# Patient Record
Sex: FEMALE | Race: WHITE | Employment: UNEMPLOYED | URBAN - METROPOLITAN AREA
[De-identification: names, ages, dates, MRNs, and addresses within clinical notes are randomized per-mention and may not be internally consistent; named-entity substitution may affect disease eponyms.]

---

## 2017-12-04 ENCOUNTER — APPOINTMENT (OUTPATIENT)
Dept: CT IMAGING | Age: 47
End: 2017-12-04
Attending: EMERGENCY MEDICINE
Payer: COMMERCIAL

## 2017-12-04 ENCOUNTER — HOSPITAL ENCOUNTER (OUTPATIENT)
Age: 47
Setting detail: OBSERVATION
Discharge: HOME | End: 2017-12-05
Attending: EMERGENCY MEDICINE | Admitting: INTERNAL MEDICINE
Payer: COMMERCIAL

## 2017-12-04 ENCOUNTER — APPOINTMENT (OUTPATIENT)
Dept: MRI IMAGING | Age: 47
End: 2017-12-04
Attending: PSYCHIATRY & NEUROLOGY
Payer: COMMERCIAL

## 2017-12-04 DIAGNOSIS — G45.1 TRANSIENT ISCHEMIC ATTACK INVOLVING RIGHT INTERNAL CAROTID ARTERY: ICD-10-CM

## 2017-12-04 DIAGNOSIS — G40.209 PARTIAL SYMPTOMATIC EPILEPSY WITH COMPLEX PARTIAL SEIZURES, NOT INTRACTABLE, WITHOUT STATUS EPILEPTICUS (HCC): ICD-10-CM

## 2017-12-04 DIAGNOSIS — I65.23 BILATERAL CAROTID ARTERY STENOSIS: ICD-10-CM

## 2017-12-04 DIAGNOSIS — G45.9 TRANSIENT CEREBRAL ISCHEMIA, UNSPECIFIED TYPE: Primary | ICD-10-CM

## 2017-12-04 DIAGNOSIS — R41.82 ALTERED MENTAL STATUS, UNSPECIFIED ALTERED MENTAL STATUS TYPE: ICD-10-CM

## 2017-12-04 DIAGNOSIS — R20.0 LEFT SIDED NUMBNESS: ICD-10-CM

## 2017-12-04 DIAGNOSIS — M96.1 CERVICAL POST-LAMINECTOMY SYNDROME: ICD-10-CM

## 2017-12-04 PROBLEM — R20.2 PARESTHESIA: Status: ACTIVE | Noted: 2017-12-04

## 2017-12-04 PROBLEM — I10 HYPERTENSION: Status: ACTIVE | Noted: 2017-12-04

## 2017-12-04 PROBLEM — E78.5 HYPERLIPIDEMIA: Status: ACTIVE | Noted: 2017-12-04

## 2017-12-04 LAB
ALBUMIN SERPL-MCNC: 4.3 G/DL (ref 3.5–5)
ALBUMIN/GLOB SERPL: 1.1 {RATIO} (ref 1.1–2.2)
ALP SERPL-CCNC: 56 U/L (ref 45–117)
ALT SERPL-CCNC: 38 U/L (ref 12–78)
AMPHET UR QL SCN: NEGATIVE
ANION GAP SERPL CALC-SCNC: 6 MMOL/L (ref 5–15)
APPEARANCE UR: ABNORMAL
AST SERPL-CCNC: 27 U/L (ref 15–37)
BACTERIA URNS QL MICRO: ABNORMAL /HPF
BARBITURATES UR QL SCN: NEGATIVE
BASOPHILS # BLD: 0 K/UL (ref 0–0.1)
BASOPHILS NFR BLD: 0 % (ref 0–1)
BENZODIAZ UR QL: NEGATIVE
BILIRUB SERPL-MCNC: 0.4 MG/DL (ref 0.2–1)
BILIRUB UR QL: NEGATIVE
BUN SERPL-MCNC: 13 MG/DL (ref 6–20)
BUN/CREAT SERPL: 13 (ref 12–20)
CALCIUM SERPL-MCNC: 8.8 MG/DL (ref 8.5–10.1)
CANNABINOIDS UR QL SCN: NEGATIVE
CHLORIDE SERPL-SCNC: 102 MMOL/L (ref 97–108)
CHOLEST SERPL-MCNC: 268 MG/DL
CO2 SERPL-SCNC: 29 MMOL/L (ref 21–32)
COCAINE UR QL SCN: NEGATIVE
COLOR UR: ABNORMAL
CREAT SERPL-MCNC: 1.02 MG/DL (ref 0.55–1.02)
CRP SERPL HS-MCNC: 1 MG/L
DRUG SCRN COMMENT,DRGCM: ABNORMAL
EOSINOPHIL # BLD: 0.4 K/UL (ref 0–0.4)
EOSINOPHIL NFR BLD: 3 % (ref 0–7)
EPITH CASTS URNS QL MICRO: ABNORMAL /LPF
ERYTHROCYTE [DISTWIDTH] IN BLOOD BY AUTOMATED COUNT: 12.5 % (ref 11.5–14.5)
ERYTHROCYTE [SEDIMENTATION RATE] IN BLOOD: 3 MM/HR (ref 0–20)
EST. AVERAGE GLUCOSE BLD GHB EST-MCNC: 114 MG/DL
GLOBULIN SER CALC-MCNC: 4 G/DL (ref 2–4)
GLUCOSE SERPL-MCNC: 113 MG/DL (ref 65–100)
GLUCOSE UR STRIP.AUTO-MCNC: NEGATIVE MG/DL
HBA1C MFR BLD: 5.6 % (ref 4.2–6.3)
HCG UR QL: NEGATIVE
HCT VFR BLD AUTO: 41.2 % (ref 35–47)
HDLC SERPL-MCNC: 48 MG/DL
HDLC SERPL: 5.6 {RATIO} (ref 0–5)
HGB BLD-MCNC: 13.8 G/DL (ref 11.5–16)
HGB UR QL STRIP: NEGATIVE
KETONES UR QL STRIP.AUTO: NEGATIVE MG/DL
LDLC SERPL CALC-MCNC: 169.4 MG/DL (ref 0–100)
LEUKOCYTE ESTERASE UR QL STRIP.AUTO: NEGATIVE
LIPID PROFILE,FLP: ABNORMAL
LYMPHOCYTES # BLD: 2 K/UL (ref 0.8–3.5)
LYMPHOCYTES NFR BLD: 19 % (ref 12–49)
MCH RBC QN AUTO: 30.9 PG (ref 26–34)
MCHC RBC AUTO-ENTMCNC: 33.5 G/DL (ref 30–36.5)
MCV RBC AUTO: 92.2 FL (ref 80–99)
METHADONE UR QL: NEGATIVE
MONOCYTES # BLD: 0.5 K/UL (ref 0–1)
MONOCYTES NFR BLD: 4 % (ref 5–13)
MUCOUS THREADS URNS QL MICRO: ABNORMAL /LPF
NEUTS SEG # BLD: 7.7 K/UL (ref 1.8–8)
NEUTS SEG NFR BLD: 74 % (ref 32–75)
NITRITE UR QL STRIP.AUTO: NEGATIVE
OPIATES UR QL: POSITIVE
PCP UR QL: NEGATIVE
PH UR STRIP: 7 [PH] (ref 5–8)
PLATELET # BLD AUTO: 283 K/UL (ref 150–400)
POTASSIUM SERPL-SCNC: 3.6 MMOL/L (ref 3.5–5.1)
PROT SERPL-MCNC: 8.3 G/DL (ref 6.4–8.2)
PROT UR STRIP-MCNC: NEGATIVE MG/DL
RBC # BLD AUTO: 4.47 M/UL (ref 3.8–5.2)
RBC #/AREA URNS HPF: ABNORMAL /HPF (ref 0–5)
SODIUM SERPL-SCNC: 137 MMOL/L (ref 136–145)
SP GR UR REFRACTOMETRY: 1.02 (ref 1–1.03)
T3FREE SERPL-MCNC: 2.8 PG/ML (ref 2.2–4)
T4 FREE SERPL-MCNC: 1.1 NG/DL (ref 0.8–1.5)
TRIGL SERPL-MCNC: 253 MG/DL (ref ?–150)
TSH SERPL DL<=0.05 MIU/L-ACNC: 0.64 UIU/ML (ref 0.36–3.74)
UROBILINOGEN UR QL STRIP.AUTO: 1 EU/DL (ref 0.2–1)
VLDLC SERPL CALC-MCNC: 50.6 MG/DL
WBC # BLD AUTO: 10.5 K/UL (ref 3.6–11)
WBC URNS QL MICRO: ABNORMAL /HPF (ref 0–4)

## 2017-12-04 PROCEDURE — 96374 THER/PROPH/DIAG INJ IV PUSH: CPT

## 2017-12-04 PROCEDURE — 84481 FREE ASSAY (FT-3): CPT | Performed by: INTERNAL MEDICINE

## 2017-12-04 PROCEDURE — 85652 RBC SED RATE AUTOMATED: CPT | Performed by: INTERNAL MEDICINE

## 2017-12-04 PROCEDURE — 80307 DRUG TEST PRSMV CHEM ANLYZR: CPT | Performed by: INTERNAL MEDICINE

## 2017-12-04 PROCEDURE — 70553 MRI BRAIN STEM W/O & W/DYE: CPT

## 2017-12-04 PROCEDURE — 80053 COMPREHEN METABOLIC PANEL: CPT | Performed by: EMERGENCY MEDICINE

## 2017-12-04 PROCEDURE — 99218 HC RM OBSERVATION: CPT

## 2017-12-04 PROCEDURE — 77030032490 HC SLV COMPR SCD KNE COVD -B

## 2017-12-04 PROCEDURE — 74011250636 HC RX REV CODE- 250/636: Performed by: INTERNAL MEDICINE

## 2017-12-04 PROCEDURE — 74011250637 HC RX REV CODE- 250/637: Performed by: HOSPITALIST

## 2017-12-04 PROCEDURE — 84443 ASSAY THYROID STIM HORMONE: CPT | Performed by: INTERNAL MEDICINE

## 2017-12-04 PROCEDURE — 74011250637 HC RX REV CODE- 250/637: Performed by: INTERNAL MEDICINE

## 2017-12-04 PROCEDURE — 95816 EEG AWAKE AND DROWSY: CPT | Performed by: INTERNAL MEDICINE

## 2017-12-04 PROCEDURE — 83036 HEMOGLOBIN GLYCOSYLATED A1C: CPT | Performed by: INTERNAL MEDICINE

## 2017-12-04 PROCEDURE — 36415 COLL VENOUS BLD VENIPUNCTURE: CPT | Performed by: EMERGENCY MEDICINE

## 2017-12-04 PROCEDURE — 96361 HYDRATE IV INFUSION ADD-ON: CPT

## 2017-12-04 PROCEDURE — 84439 ASSAY OF FREE THYROXINE: CPT | Performed by: INTERNAL MEDICINE

## 2017-12-04 PROCEDURE — 99284 EMERGENCY DEPT VISIT MOD MDM: CPT

## 2017-12-04 PROCEDURE — 74011250636 HC RX REV CODE- 250/636: Performed by: PSYCHIATRY & NEUROLOGY

## 2017-12-04 PROCEDURE — 86592 SYPHILIS TEST NON-TREP QUAL: CPT | Performed by: INTERNAL MEDICINE

## 2017-12-04 PROCEDURE — 81025 URINE PREGNANCY TEST: CPT | Performed by: EMERGENCY MEDICINE

## 2017-12-04 PROCEDURE — 80061 LIPID PANEL: CPT | Performed by: INTERNAL MEDICINE

## 2017-12-04 PROCEDURE — 85025 COMPLETE CBC W/AUTO DIFF WBC: CPT | Performed by: EMERGENCY MEDICINE

## 2017-12-04 PROCEDURE — 70544 MR ANGIOGRAPHY HEAD W/O DYE: CPT

## 2017-12-04 PROCEDURE — 86038 ANTINUCLEAR ANTIBODIES: CPT | Performed by: INTERNAL MEDICINE

## 2017-12-04 PROCEDURE — A9576 INJ PROHANCE MULTIPACK: HCPCS | Performed by: INTERNAL MEDICINE

## 2017-12-04 PROCEDURE — 70450 CT HEAD/BRAIN W/O DYE: CPT

## 2017-12-04 PROCEDURE — 81001 URINALYSIS AUTO W/SCOPE: CPT | Performed by: EMERGENCY MEDICINE

## 2017-12-04 PROCEDURE — 86141 C-REACTIVE PROTEIN HS: CPT | Performed by: INTERNAL MEDICINE

## 2017-12-04 RX ORDER — HYDROCHLOROTHIAZIDE 12.5 MG/1
12.5 CAPSULE ORAL DAILY
Status: DISCONTINUED | OUTPATIENT
Start: 2017-12-05 | End: 2017-12-05

## 2017-12-04 RX ORDER — LORAZEPAM 2 MG/ML
0.5 INJECTION INTRAMUSCULAR
Status: DISCONTINUED | OUTPATIENT
Start: 2017-12-04 | End: 2017-12-05

## 2017-12-04 RX ORDER — SODIUM CHLORIDE 0.9 % (FLUSH) 0.9 %
5-10 SYRINGE (ML) INJECTION AS NEEDED
Status: DISCONTINUED | OUTPATIENT
Start: 2017-12-04 | End: 2017-12-05 | Stop reason: HOSPADM

## 2017-12-04 RX ORDER — OXYCODONE AND ACETAMINOPHEN 5; 325 MG/1; MG/1
1 TABLET ORAL
Status: DISCONTINUED | OUTPATIENT
Start: 2017-12-04 | End: 2017-12-05 | Stop reason: HOSPADM

## 2017-12-04 RX ORDER — ENOXAPARIN SODIUM 100 MG/ML
40 INJECTION SUBCUTANEOUS DAILY
Status: DISCONTINUED | OUTPATIENT
Start: 2017-12-05 | End: 2017-12-05 | Stop reason: HOSPADM

## 2017-12-04 RX ORDER — ENOXAPARIN SODIUM 100 MG/ML
40 INJECTION SUBCUTANEOUS EVERY 24 HOURS
Status: DISCONTINUED | OUTPATIENT
Start: 2017-12-04 | End: 2017-12-04

## 2017-12-04 RX ORDER — ATORVASTATIN CALCIUM 20 MG/1
20 TABLET, FILM COATED ORAL DAILY
COMMUNITY
End: 2017-12-13

## 2017-12-04 RX ORDER — NITROFURANTOIN 25; 75 MG/1; MG/1
100 CAPSULE ORAL 2 TIMES DAILY
COMMUNITY
Start: 2017-12-01 | End: 2017-12-07

## 2017-12-04 RX ORDER — LANOLIN ALCOHOL/MO/W.PET/CERES
3 CREAM (GRAM) TOPICAL
Status: DISCONTINUED | OUTPATIENT
Start: 2017-12-04 | End: 2017-12-05 | Stop reason: HOSPADM

## 2017-12-04 RX ORDER — LISINOPRIL 20 MG/1
20 TABLET ORAL DAILY
Status: DISCONTINUED | OUTPATIENT
Start: 2017-12-05 | End: 2017-12-05

## 2017-12-04 RX ORDER — ATORVASTATIN CALCIUM 20 MG/1
20 TABLET, FILM COATED ORAL
Status: DISCONTINUED | OUTPATIENT
Start: 2017-12-04 | End: 2017-12-05 | Stop reason: HOSPADM

## 2017-12-04 RX ORDER — LISINOPRIL AND HYDROCHLOROTHIAZIDE 12.5; 2 MG/1; MG/1
1 TABLET ORAL DAILY
COMMUNITY
End: 2017-12-05

## 2017-12-04 RX ORDER — NITROFURANTOIN 25; 75 MG/1; MG/1
100 CAPSULE ORAL 2 TIMES DAILY
Status: DISCONTINUED | OUTPATIENT
Start: 2017-12-04 | End: 2017-12-05 | Stop reason: HOSPADM

## 2017-12-04 RX ORDER — ENOXAPARIN SODIUM 100 MG/ML
40 INJECTION SUBCUTANEOUS DAILY
Status: DISCONTINUED | OUTPATIENT
Start: 2017-12-04 | End: 2017-12-04

## 2017-12-04 RX ORDER — SODIUM CHLORIDE 9 MG/ML
75 INJECTION, SOLUTION INTRAVENOUS CONTINUOUS
Status: DISCONTINUED | OUTPATIENT
Start: 2017-12-04 | End: 2017-12-05

## 2017-12-04 RX ORDER — GUAIFENESIN 100 MG/5ML
81 LIQUID (ML) ORAL DAILY
Status: DISCONTINUED | OUTPATIENT
Start: 2017-12-04 | End: 2017-12-05 | Stop reason: HOSPADM

## 2017-12-04 RX ORDER — SODIUM CHLORIDE 0.9 % (FLUSH) 0.9 %
5-10 SYRINGE (ML) INJECTION EVERY 8 HOURS
Status: DISCONTINUED | OUTPATIENT
Start: 2017-12-04 | End: 2017-12-05 | Stop reason: HOSPADM

## 2017-12-04 RX ORDER — LABETALOL HYDROCHLORIDE 5 MG/ML
10 INJECTION, SOLUTION INTRAVENOUS
Status: DISCONTINUED | OUTPATIENT
Start: 2017-12-04 | End: 2017-12-05 | Stop reason: HOSPADM

## 2017-12-04 RX ORDER — ACETAMINOPHEN 325 MG/1
325 TABLET ORAL
Status: DISCONTINUED | OUTPATIENT
Start: 2017-12-04 | End: 2017-12-05 | Stop reason: HOSPADM

## 2017-12-04 RX ADMIN — SODIUM CHLORIDE 75 ML/HR: 900 INJECTION, SOLUTION INTRAVENOUS at 14:25

## 2017-12-04 RX ADMIN — OXYCODONE HYDROCHLORIDE AND ACETAMINOPHEN 1 TABLET: 5; 325 TABLET ORAL at 23:57

## 2017-12-04 RX ADMIN — MELATONIN 3 MG ORAL TABLET 3 MG: 3 TABLET ORAL at 22:06

## 2017-12-04 RX ADMIN — ASPIRIN 81 MG 81 MG: 81 TABLET ORAL at 14:30

## 2017-12-04 RX ADMIN — LORAZEPAM 0.5 MG: 2 INJECTION INTRAMUSCULAR; INTRAVENOUS at 17:29

## 2017-12-04 RX ADMIN — Medication 10 ML: at 14:30

## 2017-12-04 RX ADMIN — NITROFURANTOIN (MONOHYDRATE/MACROCRYSTALS) 100 MG: 75; 25 CAPSULE ORAL at 17:29

## 2017-12-04 RX ADMIN — GADOTERIDOL 15 ML: 279.3 INJECTION, SOLUTION INTRAVENOUS at 19:25

## 2017-12-04 RX ADMIN — ACETAMINOPHEN 325 MG: 325 TABLET ORAL at 16:52

## 2017-12-04 RX ADMIN — Medication 10 ML: at 22:07

## 2017-12-04 NOTE — ROUTINE PROCESS

## 2017-12-04 NOTE — PROGRESS NOTES
Pharmacy Clarification of Prior to Admission Medication Regimen     The patient was interviewed regarding clarification of the prior to admission medication regimen. Friend present in room and obtained permission from patient to discuss drug regimen with visitor(s) present. Patient was questioned regarding use of any other inhalers, topical products, over the counter medications, herbal medications, vitamin products or ophthalmic/nasal/otic medication use. Information Obtained From: RX query, medication vials and patient    Pertinent Pharmacy Findings:   Atorvastatin 20 mg: Patient was prescribed and started taking this agent ~3 weeks ago, but after four days of therapy developed abdominal pain and stopped taking medication as she believed it was an adverse effect of the medication. She has not discussed this with her provider, thus the medication has been left on the [facility] medication list as \"not taking\"   Sleep Aids (melatonin PO, emergen-C packets and ibuprofen + diphenhydramine): patient states that she \"rotates\" her use of these agents to help her sleep. Of note, she could not remember the strength of her OTC melatonin and the Emergen-C formulation she uses apparently also includes melatonin. [facility] medication list was corrected to the following:     Prior to Admission Medications   Prescriptions Last Dose Informant Patient Reported? Taking? Ascorbic Acid-Multivits-Min (EMERGEN-C) 1,000 mg pwep 11/27/2017 at Unknown time Self Yes Yes   Sig: Take 1 Packet by mouth nightly as needed. Ibuprofen-diphenhydrAMINE (ADVIL PM) 200-38 mg tab 12/3/2017 at Unknown time Self Yes Yes   Sig: Take 2 Tabs by mouth nightly. MELATONIN PO 12/3/2017 at Unknown time Self Yes Yes   Sig: Take 3 Tabs by mouth nightly as needed (sleep aid). atorvastatin (LIPITOR) 20 mg tablet 11/27/2017 Self Yes No   Sig: Take 20 mg by mouth daily.    lisinopril-hydroCHLOROthiazide (PRINZIDE, ZESTORETIC) 20-12.5 mg per tablet 12/4/2017 at Unknown time Self Yes Yes   Sig: Take 1 Tab by mouth daily. nitrofurantoin, macrocrystal-monohydrate, (MACROBID) 100 mg capsule 12/4/2017 at Unknown time Self Yes Yes   Sig: Take 100 mg by mouth two (2) times a day. ondansetron (ZOFRAN ODT) 4 mg disintegrating tablet 11/27/2017 at Unknown time Self No Yes   Sig: Take 1 Tab by mouth every eight (8) hours as needed for Nausea.       Facility-Administered Medications: None          Thank you,  Sowmya Peraza, PharmD, BCPS  Clinical Pharmacy Specialist

## 2017-12-04 NOTE — CONSULTS
Consult  REFERRED BY:  Carla Shannon MD    CHIEF COMPLAINT: Numbness of the left side      Subjective:     Kayleen Gee is a 41 y.o. right-handed  female seen as a new patient to us for evaluation of a new problem of numbness of the left side and intermittent muscle spasms and jerks of the left side and have been present since 12/2/2017 at the request of Dr. Gail Teixeira. She also feels a heavy sensation in her chest.  Her symptoms seem to get worse when she lays down improve when she stands up. She has had a recent urinary tract infection. She had some adverse twitching of her muscles on Paxil which she took about a month ago. She has never had this problem before. Her head CT scan of the brain was normal.  She scheduled for an MRI scan. She scheduled for an MRA and carotid Dopplers. She is also scheduled for an EEG. She does not seem to have any twitches now, but still feels that her left side is not quite as strong as the right. Her only risk factors for stroke include high blood pressure. She denies any unusual stress and tension. Her routine metabolic studies are relatively unremarkable. She did have a previous cervical laminectomy, but her neck has not caused any increased pain recently.     Past Medical History:   Diagnosis Date    Hypertension       Past Surgical History:   Procedure Laterality Date    HX CHOLECYSTECTOMY      HX ORTHOPAEDIC      herniated disc     HX UROLOGICAL      URETHRAL REPAIR     Family History   Problem Relation Age of Onset    Hypertension Mother     Stroke Mother     Hypertension Father     Dementia Father       Social History   Substance Use Topics    Smoking status: Never Smoker    Smokeless tobacco: Never Used    Alcohol use 1.5 oz/week     3 Glasses of wine per week         Current Facility-Administered Medications:     sodium chloride (NS) flush 5-10 mL, 5-10 mL, IntraVENous, Q8H, Evelina Jenkins MD    sodium chloride (NS) flush 5-10 mL, 5-10 mL, IntraVENous, PRN, Robin Dailey MD    atorvastatin (LIPITOR) tablet 20 mg, 20 mg, Oral, QHS, Zack Macedo MD    nitrofurantoin (macrocrystal-monohydrate) (MACROBID) capsule 100 mg, 100 mg, Oral, BID, Zack Macedo MD, 100 mg at 12/04/17 1729    0.9% sodium chloride infusion, 75 mL/hr, IntraVENous, CONTINUOUS, Zack Macedo MD, Last Rate: 75 mL/hr at 12/04/17 1425, 75 mL/hr at 12/04/17 1425    aspirin chewable tablet 81 mg, 81 mg, Oral, DAILY, Zack Macedo MD, 81 mg at 12/04/17 1430    labetalol (NORMODYNE;TRANDATE) injection 10 mg, 10 mg, IntraVENous, Q6H PRN, Zack Macedo MD    oxyCODONE-acetaminophen (PERCOCET) 5-325 mg per tablet 1 Tab, 1 Tab, Oral, Q6H PRN, Zack Macedo MD    sodium chloride (NS) flush 5-10 mL, 5-10 mL, IntraVENous, Q8H, Zack Macedo MD, 10 mL at 12/04/17 1430    sodium chloride (NS) flush 5-10 mL, 5-10 mL, IntraVENous, PRN, Zack Macedo MD    [START ON 12/5/2017] enoxaparin (LOVENOX) injection 40 mg, 40 mg, SubCUTAneous, DAILY, Zack Macedo MD    prochlorperazine (COMPAZINE) with saline injection 10 mg, 10 mg, IntraVENous, Q6H PRN, Zack Macedo MD    [START ON 12/5/2017] lisinopril (PRINIVIL, ZESTRIL) tablet 20 mg, 20 mg, Oral, DAILY, MD Carlito Vasquez ON 12/5/2017] hydroCHLOROthiazide (MICROZIDE) capsule 12.5 mg, 12.5 mg, Oral, DAILY, Zack Macedo MD    acetaminophen (TYLENOL) tablet 325 mg, 325 mg, Oral, Q4H PRN, Ananya Duncan MD, 325 mg at 12/04/17 1652    LORazepam (ATIVAN) injection 0.5 mg, 0.5 mg, IntraVENous, Q6H PRN, Renato Eddy MD, 0.5 mg at 12/04/17 2290        Allergies   Allergen Reactions    Norvasc [Amlodipine] Swelling     Swelling to feet and ankles    Paxil [Paroxetine Hcl] Other (comments)     Serotonin SD        Review of Systems:  A comprehensive review of systems was negative except for: Constitutional: positive for fatigue and malaise  Musculoskeletal: positive for myalgias, arthralgias, stiff joints and back pain  Neurological: positive for seizures, paresthesia, coordination problems, gait problems and weakness  Behvioral/Psych: positive for anxiety and depression   Vitals:    12/04/17 1233 12/04/17 1402 12/04/17 1411 12/04/17 1604   BP:  113/72  109/74   Pulse:  77  80   Resp:  18  18   Temp:  98 °F (36.7 °C)  98.2 °F (36.8 °C)   SpO2:  97%  95%   Weight: 175 lb (79.4 kg)      Height: 5' 7\" (1.702 m)  5' 7\" (1.702 m)      Objective:     I      NEUROLOGICAL EXAM:    Appearance: The patient is well developed, well nourished, provides a coherent history and is in no acute distress. Mental Status: Oriented to time, place and person, and the president, cognitive function is normal and speech is fluent and no aphasia or dysarthria. Mood and affect appropriate, but seems a bit depressed and anxious. Cranial Nerves:   Intact visual fields. Fundi are benign. DARIO, EOM's full, no nystagmus, no ptosis. Facial sensation is normal. Corneal reflexes are not tested. Facial movement is symmetric. Hearing is normal bilaterally. Palate is midline with normal sternocleidomastoid and trapezius muscles are normal. Tongue is midline. Neck without meningismus or bruits  Temporal arteries are not tender or enlarged   Motor:  5/5 strength in upper and lower proximal and distal muscles. Normal bulk and tone. No fasciculations. Reflexes:   Deep tendon reflexes 2+/4 and symmetrical.  No babinski or clonus present   Sensory:   Normal to touch, pinprick and vibration and temperature. DSS is intact   Gait:  Normal gait. Tremor:   No tremor noted. Cerebellar:  No cerebellar signs present. Neurovascular:  Normal heart sounds and regular rhythm, peripheral pulses intact, and no carotid bruits. Assessment:       ICD-10-CM ICD-9-CM    1.  Transient cerebral ischemia, unspecified type G45.9 435.9      Active Problems:    Hypertension (12/4/2017)      Hyperlipidemia (12/4/2017)      Paresthesia (12/4/2017)      Transient ischemic attack involving right internal carotid artery (12/4/2017)      Bilateral carotid artery stenosis (12/4/2017)      Nonintractable epilepsy with complex partial seizures ([address] (12/4/2017)      Altered mental status, unspecified (12/4/2017)      Left sided numbness (12/4/2017)      Cervical post-laminectomy syndrome (12/4/2017)        Plan:     Patient with unusual symptoms of left-sided numbness and left-sided muscle spasms and jerks, could be a focal seizure but it does not sound like that, will need MRI scan of the brain to rule out tumor, rule out stroke, without causes of her symptoms. Also get an EEG and carotid Doppler study to rule out other causes of her symptoms. His echocardiogram and cardiac monitoring  Continue antiplatelets and statin for now, further treatment evaluation pending the results of her clinical course and testing results and response to therapy. Her exam objectively is not too remarkable though she still complains of subjective weakness and numbness on the left side.     Signed By: Chikis Ghosh MD     December 4, 2017       CC: Carla Shannon MD  FAX: None

## 2017-12-04 NOTE — PROGRESS NOTES
[facility] Stroke Education [place] and Stroke Education provided to patient and the following topics were discussed    1. Patients personal risk factors for stroke are family history hpyerlipids    2. Warning signs of Stroke:        * Sudden numbness or weakness of the face, arm or leg, especially on one side of          The body            * Sudden confusion, trouble speaking or understanding        * Sudden trouble seeing in one or both eyes        * Sudden trouble walking, dizziness, loss of balance or coordination        * Sudden severe headache with no known cause      3. Importance of activation Emergency Medical Services ( 9-1-1 ) immediately if                       experience any warning signs of stroke. 4. Be sure and schedule a follow-up appointment with your primary care doctor or any                  specialists as instructed. 5. You must take medicine every day to treat your risk factors for stroke. Be sure to take your medicines exactly as your doctor tells you: no more, no less. Know what your medicines are for , what they do. Anti-thrombotics /anticoagulants can help prevent strokes. You are taking the following medicine(s)  asa     6. Smoking and second-hand smoke greatly increase your risk of stroke, cardiovascular disease and death.  Smoking history never

## 2017-12-04 NOTE — ED PROVIDER NOTES
EMERGENCY DEPARTMENT HISTORY AND PHYSICAL EXAM      Date: 12/4/2017  Patient Name: Joselyn Gonzalez    History of Presenting Illness     Chief Complaint   Patient presents with    Numbness     Pt. complains of \"uncontrollable twitching on left side\" that started since Saturday. Pt. was seen on Friday with UTI        History Provided By: Patient    HPI: Joselyn Gonzalez, 41 y.o. female with PMHx significant for HTN, presents ambulatory to the ED with cc of intermittent left sided facial numbness, LUE weakness and tremors to the left upper and lower extremities since 12/2/2017. Pt also reports that she feels a heavy sensation to her chest. She states that her sx's worsen when she lays flat and improves when she stands up. She reports taking Aleve and Advil PM but denies any relief these medications. She reports that she was seen by her OBGYN on 12/1/2017. She states she was diagnosed with a UTI and prescribed Macrobid BID. She reports experiencing tremors in the past, noting her PCP believed that this was related to an adverse reaction to Paxil at that time. As a result she notes concern about whether her sx's today could be related to her taking Macrobid. However she states she's taken this medication in the past with no complications. She specifically denies any fevers, chills, nausea, vomiting, shortness of breath, headache, rash, diarrhea, sweating or weight loss. PCP: Carla Shannon MD    There are no other complaints, changes, or physical findings at this time.     Current Facility-Administered Medications   Medication Dose Route Frequency Provider Last Rate Last Dose    sodium chloride (NS) flush 5-10 mL  5-10 mL IntraVENous Q8H Perla Lyon MD        sodium chloride (NS) flush 5-10 mL  5-10 mL IntraVENous PRN Perla Lyon MD         Current Outpatient Prescriptions   Medication Sig Dispense Refill    phenazopyridine (PYRIDIUM) 100 mg tablet Take 200 mg by mouth three (3) times daily (after meals).  hydrocodone-acetaminophen (VICODIN) 5-500 mg per tablet Take 1 Tab by mouth.  silodosin (RAPAFLO) 8 mg capsule Take 8 mg by mouth daily (with breakfast).  oxycodone-acetaminophen (PERCOCET) 5-325 mg per tablet Take 1-2 Tabs by mouth every four (4) hours as needed for Pain. 20 Tab 0    ondansetron (ZOFRAN ODT) 4 mg disintegrating tablet Take 1 Tab by mouth every eight (8) hours as needed for Nausea. 10 Tab 0    lisinopril (PRINIVIL, ZESTRIL) 10 mg tablet Take  by mouth daily.  hydrochlorothiazide (HYDRODIURIL) 25 mg tablet Take 25 mg by mouth daily. Past History     Past Medical History:  Past Medical History:   Diagnosis Date    Hypertension        Past Surgical History:  Past Surgical History:   Procedure Laterality Date    HX CHOLECYSTECTOMY      HX ORTHOPAEDIC      herniated disc     HX UROLOGICAL      URETHRAL REPAIR       Family History:  Family History   Problem Relation Age of Onset   George Dent Hypertension Mother     Hypertension Father        Social History:  Social History   Substance Use Topics    Smoking status: Never Smoker    Smokeless tobacco: Never Used    Alcohol use 1.5 oz/week     3 Glasses of wine per week       Allergies: Allergies   Allergen Reactions    Norvasc [Amlodipine] Swelling     Swelling to feet and ankles         Review of Systems   Review of Systems   Constitutional: Negative for activity change, appetite change, chills, fatigue, fever and unexpected weight change. HENT: Negative. Negative for congestion, hearing loss, rhinorrhea, sneezing and voice change. Eyes: Negative. Negative for pain and visual disturbance. Respiratory: Negative. Negative for apnea, cough, choking, chest tightness and shortness of breath. Cardiovascular: Negative for palpitations. +heavy sensation to chest   Gastrointestinal: Negative. Negative for abdominal distention, abdominal pain, blood in stool, diarrhea, nausea and vomiting.    Genitourinary: Negative. Negative for difficulty urinating, flank pain, frequency and urgency. No discharge   Musculoskeletal: Negative for arthralgias, back pain, myalgias and neck stiffness. Skin: Negative. Negative for color change and rash. Neurological: Positive for tremors, weakness and numbness. Negative for dizziness, seizures, syncope, speech difficulty and headaches. Hematological: Negative for adenopathy. Psychiatric/Behavioral: Negative. Negative for agitation, behavioral problems, dysphoric mood and suicidal ideas. The patient is not nervous/anxious. All other systems reviewed and are negative. Physical Exam   Physical Exam   Constitutional: She is oriented to person, place, and time. She appears well-developed and well-nourished. No distress. HENT:   Head: Normocephalic and atraumatic. Mouth/Throat: Oropharynx is clear and moist. No oropharyngeal exudate. Eyes: Conjunctivae and EOM are normal. Pupils are equal, round, and reactive to light. Right eye exhibits no discharge. Left eye exhibits no discharge. Neck: Normal range of motion. Neck supple. Cardiovascular: Normal rate, regular rhythm and intact distal pulses. Exam reveals no gallop and no friction rub. No murmur heard. Pulmonary/Chest: Effort normal and breath sounds normal. No respiratory distress. She has no wheezes. She has no rales. She exhibits no tenderness. Abdominal: Soft. Bowel sounds are normal. She exhibits no distension and no mass. There is no tenderness. There is no rebound and no guarding. Musculoskeletal: Normal range of motion. She exhibits no edema. Lymphadenopathy:     She has no cervical adenopathy. Neurological: She is alert and oriented to person, place, and time. No cranial nerve deficit. Coordination normal.   No appreciable motor, neuro, vascular or sensory deficits    Skin: Skin is warm and dry. No rash noted. No erythema. Psychiatric: Her mood appears anxious.    Nursing note and vitals reviewed. Diagnostic Study Results     Labs -     Recent Results (from the past 12 hour(s))   CBC WITH AUTOMATED DIFF    Collection Time: 12/04/17 10:07 AM   Result Value Ref Range    WBC 10.5 3.6 - 11.0 K/uL    RBC 4.47 3.80 - 5.20 M/uL    HGB 13.8 11.5 - 16.0 g/dL    HCT 41.2 35.0 - 47.0 %    MCV 92.2 80.0 - 99.0 FL    MCH 30.9 26.0 - 34.0 PG    MCHC 33.5 30.0 - 36.5 g/dL    RDW 12.5 11.5 - 14.5 %    PLATELET 325 640 - 509 K/uL    NEUTROPHILS 74 32 - 75 %    LYMPHOCYTES 19 12 - 49 %    MONOCYTES 4 (L) 5 - 13 %    EOSINOPHILS 3 0 - 7 %    BASOPHILS 0 0 - 1 %    ABS. NEUTROPHILS 7.7 1.8 - 8.0 K/UL    ABS. LYMPHOCYTES 2.0 0.8 - 3.5 K/UL    ABS. MONOCYTES 0.5 0.0 - 1.0 K/UL    ABS. EOSINOPHILS 0.4 0.0 - 0.4 K/UL    ABS. BASOPHILS 0.0 0.0 - 0.1 K/UL   METABOLIC PANEL, COMPREHENSIVE    Collection Time: 12/04/17 10:07 AM   Result Value Ref Range    Sodium 137 136 - 145 mmol/L    Potassium 3.6 3.5 - 5.1 mmol/L    Chloride 102 97 - 108 mmol/L    CO2 29 21 - 32 mmol/L    Anion gap 6 5 - 15 mmol/L    Glucose 113 (H) 65 - 100 mg/dL    BUN 13 6 - 20 MG/DL    Creatinine 1.02 0.55 - 1.02 MG/DL    BUN/Creatinine ratio 13 12 - 20      GFR est AA >60 >60 ml/min/1.73m2    GFR est non-AA 59 (L) >60 ml/min/1.73m2    Calcium 8.8 8.5 - 10.1 MG/DL    Bilirubin, total 0.4 0.2 - 1.0 MG/DL    ALT (SGPT) 38 12 - 78 U/L    AST (SGOT) 27 15 - 37 U/L    Alk.  phosphatase 56 45 - 117 U/L    Protein, total 8.3 (H) 6.4 - 8.2 g/dL    Albumin 4.3 3.5 - 5.0 g/dL    Globulin 4.0 2.0 - 4.0 g/dL    A-G Ratio 1.1 1.1 - 2.2     URINALYSIS W/MICROSCOPIC    Collection Time: 12/04/17 10:07 AM   Result Value Ref Range    Color YELLOW/STRAW      Appearance CLOUDY (A) CLEAR      Specific gravity 1.016 1.003 - 1.030      pH (UA) 7.0 5.0 - 8.0      Protein NEGATIVE  NEG mg/dL    Glucose NEGATIVE  NEG mg/dL    Ketone NEGATIVE  NEG mg/dL    Bilirubin NEGATIVE  NEG      Blood NEGATIVE  NEG      Urobilinogen 1.0 0.2 - 1.0 EU/dL    Nitrites NEGATIVE  NEG Leukocyte Esterase NEGATIVE  NEG      WBC 0-4 0 - 4 /hpf    RBC 0-5 0 - 5 /hpf    Epithelial cells MODERATE (A) FEW /lpf    Bacteria 1+ (A) NEG /hpf    Mucus TRACE (A) NEG /lpf       Radiologic Studies -   CT Results  (Last 48 hours)               12/04/17 1019  CT HEAD WO CONT Final result    Impression:  IMPRESSION: Normal head CT. Narrative:  EXAM:  CT HEAD WO CONT   INDICATION:  Patient presents with 2 day history of \"uncontrollable twitching of   left side\". Patient also complains of three-day history of numbness on the left   side of her face. TECHNIQUE:    Thin axial images were obtained through the calvarium and saved with standard   and bone window algorithm. Coronal and sagittal reconstructions were generated. CT dose reduction was achieved through use of a standardized protocol tailored   for this examination and automatic exposure control for dose modulation. COMPARISON: CT head 12/9/08   FINDINGS:   The ventricular size and configuration are normal.    The cerebral density is normal throughout. No evidence of intracranial hemorrhage, infarct, mass or abnormal extra-axial   fluid collections. Visualized osseous structures of the calvarium and skull base including   paranasal sinuses are unremarkable. Medical Decision Making   I am the first provider for this patient. I reviewed the vital signs, available nursing notes, past medical history, past surgical history, family history and social history. Vital Signs-Reviewed the patient's vital signs.   Patient Vitals for the past 12 hrs:   Temp Pulse Resp BP SpO2   12/04/17 1030 - 82 22 124/77 97 %   12/04/17 0955 98.5 °F (36.9 °C) 84 18 130/86 97 %       Pulse Oximetry Analysis - 98% on room air    Cardiac Monitor:   Rate: 84 bpm  Rhythm: Normal Sinus     Records Reviewed: Nursing Notes and Old Medical Records    Provider Notes (Medical Decision Making):   DDx: CVA, TIA, UTI, electrolyte abnormality, anxiety    ED Course:   Initial assessment performed. The patients presenting problems have been discussed, and they are in agreement with the care plan formulated and outlined with them. I have encouraged them to ask questions as they arise throughout their visit. PROGRESS NOTE:  10:01 AM  Pt is outside the window for TPA. Written by Arpit Mitchell, MIKE Scribe, as dictated by Rhodia Habermann. Cathy Conde MD    CONSULT NOTE:   11:07 AM  Rhodia Habermann. aCthy Conde MD spoke with Dr. Raven Hill,   Specialty: Hospitalist  Discussed pt's hx, disposition, and available diagnostic and imaging results. Reviewed care plans. Consultant will evaluate pt for admission. Written by MIKE Padillaibe, as dictated by Rhodia Habermann. Cathy Conde MD.    Disposition:  11:07 AM  Patient is being admitted to the hospital.  The results of their tests and reasons for their admission have been discussed with them and/or available family. They convey agreement and understanding for the need to be admitted and for their admission diagnosis. Consultation has been made with the inpatient physician specialist for hospitalization. PLAN:  1. Admit to Hospital    Diagnosis     Clinical Impression:   1. Transient cerebral ischemia, unspecified type        Attestations: This is note is prepared by Arpit Mitchell, acting as Scribe for Rhodia Habermann. Cathy Conde [place] [id number] Cathy Conde MD The scribe's documentation has been prepared under my direction and personally reviewed by me in its entirety. I confirm that the note above accurately reflects all work, treatment, procedures, and medical decision making performed by me.

## 2017-12-04 NOTE — IP AVS SNAPSHOT
[address] [facility] 83. 
[phone] Patient: Lorraine Mendez MRN: [id number] QTF:9/6/7982 My Medications STOP taking these medications ADVIL -38 mg Tab Generic drug:  Ibuprofen-diphenhydrAMINE  
   
  
 lisinopril-hydroCHLOROthiazide 20-12.5 mg per tablet Commonly known as:  PRINZIDE, ZESTORETIC  
   
  
  
TAKE these medications as instructed Instructions Each Dose to Equal  
 Morning Noon Evening Bedtime  
 atorvastatin 20 mg tablet Commonly known as:  LIPITOR Your last dose was: Your next dose is: Take 20 mg by mouth daily. 20 mg  
    
   
   
   
  
 butalbital-acetaminophen-caffeine -40 mg per tablet Commonly known as:  Baljinder Jesus Your last dose was: Your next dose is: Take 1 Tab by mouth every six (6) hours as needed for Headache. Max Daily Amount: 4 Tabs. 1 Tab  
    
   
   
   
  
 clonazePAM 0.5 mg tablet Commonly known as:  Elta Halsted Your last dose was: Your next dose is: Take 1 Tab by mouth two (2) times a day. Max Daily Amount: 1 mg. 0.5 mg  
    
   
   
   
  
 EMERGEN-C 1,000 mg Pwep Generic drug:  Ascorbic Acid-Multivits-Min Your last dose was: Your next dose is: Take 1 Packet by mouth nightly as needed. 1 Packet MACROBID 100 mg capsule Generic drug:  nitrofurantoin (macrocrystal-monohydrate) Your last dose was: Your next dose is: Take 100 mg by mouth two (2) times a day. 100 mg  
    
   
   
   
  
 MELATONIN PO Your last dose was: Your next dose is: Take 3 Tabs by mouth nightly as needed (sleep aid). 3 Tab  
    
   
   
   
  
 ondansetron 4 mg disintegrating tablet Commonly known as:  ZOFRAN ODT Your last dose was: Your next dose is: Take 1 Tab by mouth every eight (8) hours as needed for Nausea. 4 mg Where to Get Your Medications Information on where to get these meds will be given to you by the nurse or doctor. ! Ask your nurse or doctor about these medications  
  butalbital-acetaminophen-caffeine -40 mg per tablet  
 clonazePAM 0.5 mg tablet

## 2017-12-04 NOTE — PROGRESS NOTES
Primary Nurse Kavita Robins RN and Toribio Lake RN performed a dual skin assessment on this patient No impairment noted  Nigel score is 23

## 2017-12-04 NOTE — ED NOTES
TRANSFER - OUT REPORT:    Verbal report given to Lydia on Kathryn Vargas  being transferred to [facility] for routine progression of care       Report consisted of patients Situation, Background, Assessment and   Recommendations(SBAR). Information from the following report(s) SBAR and Recent Results was reviewed with the receiving nurse. Lines:   Peripheral IV 12/04/17 Right Antecubital (Active)   Site Assessment Clean, dry, & intact 12/4/2017 10:12 AM   Phlebitis Assessment 0 12/4/2017 10:12 AM   Infiltration Assessment 0 12/4/2017 10:12 AM   Dressing Status Clean, dry, & intact 12/4/2017 10:12 AM   Hub Color/Line Status Pink 12/4/2017 10:12 AM        Opportunity for questions and clarification was provided.       Patient transported with:   Chart  Belongings

## 2017-12-04 NOTE — ED NOTES
Assumed care of patient. Patient is alert and oriented, does not appear to be in distress. Patient ambulatory to ED with c/o facial numbness x 3 days, numbness of left side x 3 with recent UTI and recurrent twitching. Monitor x 3 applied. VSS. Patient positioned for comfort with call bell within reach. Side rails up for safety. Dr. David Castro has evaluated.

## 2017-12-04 NOTE — PROGRESS NOTES
Speech pathology  Orders received, chart reviewed and spoke with RN. RN reporting patient passed STAND and has been eating alejandro crackers without difficulty. No reported changes in speech/language. Attempted to f/u and speak with patient but neurologist in room assessing. SLP will f/u with patient tomorrow to determine if formal speech eval is indicated, though unlikely.  Eva Boothe M.S. CCC-SLP

## 2017-12-04 NOTE — IP AVS SNAPSHOT
[address] [facility] 
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 Please bring photo id, insurance card, your co-pay amount and a list of your medicaitions Phone:  [phone] Where:  [address] [facility] Follow up with [facility] in 1 week(s)  
anxiety Where:  [address] [facility], [address] [facility] [address] Tuesday Dec 26, 2017 New Patient with Shayna Bingham MD at 11:15 AM  
Where:  [facility] [address]) Tuesday Jan 16, 2018 Follow Up with Lupis Dorsey NP at 10:00 AM  
Where:  [facility] Clinic at [facility] [address]) Follow up with Marta Stout MD  
You will see his nurse practioner Nikki Cedeno at 9:30 am for 10:00 am appointment with nurse practioner-please bring new patient paperwork you received by mail, co-pay, insurance card and photo id. Phone:  [phone] Where:  [address] [facility] , [facility] [address] [place] [address], [address] [place] [address] Discharge Orders None A check freya indicates which time of day the medication should be taken. My Medications STOP taking these medications ADVIL -38 mg Tab Generic drug:  Ibuprofen-diphenhydrAMINE  
   
  
 lisinopril-hydroCHLOROthiazide 20-12.5 mg per tablet Commonly known as:  PRINZIDE, ZESTORETIC  
   
  
  
TAKE these medications as instructed Instructions Each Dose to Equal  
 Morning Noon Evening Bedtime  
 atorvastatin 20 mg tablet Commonly known as:  LIPITOR Your last dose was: Your next dose is: Take 20 mg by mouth daily. 20 mg  
    
   
   
   
  
 butalbital-acetaminophen-caffeine -40 mg per tablet Commonly known as:  Earleavikram Pantoja Your last dose was: Your next dose is: Take 1 Tab by mouth every six (6) hours as needed for Headache. Max Daily Amount: 4 Tabs. 1 Tab  
    
   
   
   
  
 clonazePAM 0.5 mg tablet Commonly known as:  Srinath Aver Your last dose was: Your next dose is: Take 1 Tab by mouth two (2) times a day. Max Daily Amount: 1 mg. 0.5 mg  
    
   
   
   
  
 EMERGEN-C 1,000 mg Pwep Generic drug:  Ascorbic Acid-Multivits-Min Your last dose was: Your next dose is: Take 1 Packet by mouth nightly as needed. 1 Packet MACROBID 100 mg capsule Generic drug:  nitrofurantoin (macrocrystal-monohydrate) Your last dose was: Your next dose is: Take 100 mg by mouth two (2) times a day. 100 mg  
    
   
   
   
  
 MELATONIN PO Your last dose was: Your next dose is: Take 3 Tabs by mouth nightly as needed (sleep aid). 3 Tab  
    
   
   
   
  
 ondansetron 4 mg disintegrating tablet Commonly known as:  ZOFRAN ODT Your last dose was: Your next dose is: Take 1 Tab by mouth every eight (8) hours as needed for Nausea. 4 mg Where to Get Your Medications Information on where to get these meds will be given to you by the nurse or doctor. ! Ask your nurse or doctor about these medications  
  butalbital-acetaminophen-caffeine -40 mg per tablet  
 clonazePAM 0.5 mg tablet Discharge Instructions HOSPITALIST DISCHARGE INSTRUCTIONS 
 
NAME: Oscar Blair :  1970 MRN:  [id number] Date/Time:  2017 3:35 PM 
 
ADMIT DATE: 2017 DISCHARGE DATE: 2017 · It is important that you take the medication exactly as they are prescribed. · Keep your medication in the bottles provided by the pharmacist and keep a list of the medication names, dosages, and times to be taken in your wallet. · Do not take other medications without consulting your doctor. What to do at [facility] Recommended diet:  Cardiac Diet Recommended activity: Activity as tolerated Check BP at home 3x /day, keep log, call PCP if BP > 150/90 or < 100/60. Discuss with PCP resuming BP meds If you have questions regarding the hospital related prescriptions or hospital related issues please call [facility] at [phone]. You can always direct your questions to your primary care doctor if you are unable to reach your hospital physician; your PCP works as an extension of your hospital doctor just like your hospital doctor is an extension of your PCP for your time at the hospital [facility]) If you experience any of the following symptoms then please call your primary care physician or return to the emergency room if you cannot get hold of your doctor: 
 
Fever, chills, nausea, vomiting, or persistent diarrhea Worsening weakness or new problems with your speech or balance Dark stools or visible blood in your stools New Leg swelling or shortness of breath as these could be signs of a clot Additional Instructions: Bring these papers with you to your follow up appointments. The papers will help your doctors be sure to continue the care plan from the hospital. 
 
 
 
 
 
 
Information obtained by : 
I understand that if any problems occur once I am at home I am to contact my physician. I understand and acknowledge receipt of the instructions indicated above. Physician's or R.N.'s Signature                                                                  Date/Time Patient or Representative Signature Playteau Announcement We are excited to announce that we are making your provider's discharge notes available to you in Playteau. You will see these notes when they are completed and signed by the physician that discharged you from your recent hospital stay. If you have any questions or concerns about any information you see in Playteau, please call the Health Information Department where you were seen or reach out to your Primary Care Provider for more information about your plan of care. Introducing [facility]! [employer] introduces Playteau patient portal. Now you can access parts of your medical record, email your doctor's office, and request medication refills online. 1. In your internet browser, go to [email or web address] 2. Click on the First Time User? Click Here link in the Sign In box. You will see the New Member Sign Up page. 3. Enter your Playteau Access Code exactly as it appears below. You will not need to use this code after youve completed the sign-up process. If you do not sign up before the expiration date, you must request a new code. · AOTMP Access Code: [email or web address] Expires: 3/5/2018  5:13 PM 
 
4. Enter the last four digits of your Social Security Number (xxxx) and Date of Birth (mm/dd/yyyy) as indicated and click Submit. You will be taken to the next sign-up page. 5. Create a AOTMP ID. This will be your AOTMP login ID and cannot be changed, so think of one that is secure and easy to remember. 6. Create a AOTMP password. You can change your password at any time. 7. Enter your Password Reset Question and Answer. This can be used at a later time if you forget your password. 8. Enter your e-mail address. You will receive e-mail notification when new information is available in [address] 9. Click Sign Up. You can now view and download portions of your medical record. 10. Click the Download Summary menu link to download a portable copy of your medical information. If you have questions, please visit the Frequently Asked Questions section of the AOTMP website. Remember, AOTMP is NOT to be used for urgent needs. For medical emergencies, dial 911. Now available from your iPhone and Android! Unresulted Labs-Please follow up with your PCP about these lab tests Order Current Status GALDINO QL, W/REFLEX CASCADE In process VITAMIN D, 25 HYROXY PANEL In process DUPLEX CAROTID BILATERAL Preliminary result Providers Seen During Your Hospitalization Provider Specialty Primary office phone Pelon Max MD Emergency Medicine [phone] Golden Cox MD Internal Medicine [phone] Your Primary Care Physician (PCP) Primary Care Physician Office Phone Office Fax [facility], [address] [place] [address] Rd [phone] You are allergic to the following Allergen Reactions Norvasc (Amlodipine) Swelling Swelling to feet and ankles Paxil (Paroxetine Hcl) Other (comments) Serotonin SD Recent Documentation Height Weight BMI OB Status Smoking Status 1.702 m 79.4 kg 27.41 kg/m2 IUD Never Smoker Emergency Contacts Name Discharge Info Relation Home Work Mobile [address] [facility] CAREGIVER [3] Parent [1] [phone] Patient Belongings The following personal items are in your possession at time of discharge: 
  Dental Appliances: None         Home Medications: None   Jewelry: None  Clothing: None    Other Valuables: None Please provide this summary of care documentation to your next provider. Signatures-by signing, you are acknowledging that this After Visit Summary has been reviewed with you and you have received a copy. Patient Signature:  ____________________________________________________________ Date:  ____________________________________________________________  
  
[facility] Provider Signature:  ____________________________________________________________ Date:  ____________________________________________________________

## 2017-12-04 NOTE — H&P
Hospitalist Admission Note    NAME: David Capps   :  1970   MRN:  [id number]     Date/Time:  2017 1:14 PM    Patient PCP: Mio Pineda MD  ________________________________________________________________________    My assessment of this patient's clinical condition and my plan of care is as follows. Assessment / Plan:  Paresthesias: r/o CVA vs drug side effect (related to diphenhydramine and ondansetron) will check MRI and start CVA protocol, D/c benadryl and Zofran, give gentle hydration and monitor, get Neurology evaluation. HTN: c/w home regimen, use labetalol prn allowing for permissive HTN.  UTI: c/w Macrobid  Code Status: Full Code  Surrogate Decision Maker: mother Benita [id number] [id number]  DVT Prophylaxis: lovenox  GI Prophylaxis: not indicated  Baseline: Independent full ADL        Subjective:   CHIEF COMPLAINT: \"I have tremors and my left side of face is numb\"    HISTORY OF PRESENT ILLNESS:     Carol Mendoza is a 41 y.o.  female  with PMHx significant for HTN, presents ambulatory to the ED with cc of intermittent left sided facial numbness, LUE weakness and tremors to the left upper and lower extremities since 2017. Pt also reports that she feels a heavy sensation to her chest. She states that her sx's worsen when she lays flat and improves when she stands up. She reports taking Aleve and Advil PM but denies any relief these medications. She reports that she was seen by her OBGYN on 2017. She states she was diagnosed with a UTI and prescribed Macrobid BID. She reports experiencing tremors in the past, noting her PCP believed that this was related to an adverse reaction to Paxil at that time. As a result she notes concern about whether her sx's today could be related to her taking Macrobid. However she states she's taken this medication in the past with no complications.  She specifically denies any fevers, chills, nausea, vomiting, shortness of breath, headache, rash, diarrhea, sweating or weight loss. At this time patient is lying in bed c/o tremors, left face numbness, dizziness, weakness, denies chest pain, no SOB, no fever, no cough, no N/V no diarrhea, no other associated symptoms. We were asked to admit for work up and evaluation of the above problems. Past Medical History:   Diagnosis Date    Hypertension         Past Surgical History:   Procedure Laterality Date    HX CHOLECYSTECTOMY      HX ORTHOPAEDIC      herniated disc     HX UROLOGICAL      URETHRAL REPAIR       Social History   Substance Use Topics    Smoking status: Never Smoker    Smokeless tobacco: Never Used    Alcohol use 1.5 oz/week     3 Glasses of wine per week        Family History   Problem Relation Age of Onset    Hypertension Mother     Stroke Mother     Hypertension Father      Allergies   Allergen Reactions    Norvasc [Amlodipine] Swelling     Swelling to feet and ankles    Paxil [Paroxetine Hcl] Other (comments)     Serotonin SD        Prior to Admission medications    Medication Sig Start Date End Date Taking? Authorizing Provider   nitrofurantoin, macrocrystal-monohydrate, (MACROBID) 100 mg capsule Take 100 mg by mouth two (2) times a day. 12/1/17 12/7/17 Yes Historical Provider   lisinopril-hydroCHLOROthiazide (PRINZIDE, ZESTORETIC) 20-12.5 mg per tablet Take 1 Tab by mouth daily. Yes Historical Provider   Ibuprofen-diphenhydrAMINE (ADVIL PM) 200-38 mg tab Take 2 Tabs by mouth nightly. Yes Historical Provider   Ascorbic Acid-Multivits-Min (EMERGEN-C) 1,000 mg pwep Take 1 Packet by mouth nightly as needed. Yes Historical Provider   MELATONIN PO Take 3 Tabs by mouth nightly as needed (sleep aid). Yes Historical Provider   ondansetron (ZOFRAN ODT) 4 mg disintegrating tablet Take 1 Tab by mouth every eight (8) hours as needed for Nausea. 7/14/11  Yes Mame Rodriguez PA-C   atorvastatin (LIPITOR) 20 mg tablet Take 20 mg by mouth daily.     Historical Provider       REVIEW OF SYSTEMS:     I am not able to complete the review of systems because: The patient is intubated and sedated    The patient has altered mental status due to his acute medical problems    The patient has baseline aphasia from prior stroke(s)    The patient has baseline dementia and is not reliable historian    The patient is in acute medical distress and unable to provide information           Total of 12 systems reviewed as follows:       POSITIVE= underlined text  Negative = text not underlined  General:  fever, chills, sweats, generalized weakness, weight loss/gain,      loss of appetite   Eyes:    blurred vision, eye pain, loss of vision, double vision  ENT:    rhinorrhea, pharyngitis   Respiratory:   cough, sputum production, SOB, LOYOLA, wheezing, pleuritic pain   Cardiology:   chest pain, palpitations, orthopnea, PND, edema, syncope   Gastrointestinal:  abdominal pain , N/V, diarrhea, dysphagia, constipation, bleeding   Genitourinary:  frequency, urgency, dysuria, hematuria, incontinence   Muskuloskeletal :  arthralgia, myalgia, back pain  Hematology:  easy bruising, nose or gum bleeding, lymphadenopathy   Dermatological: rash, ulceration, pruritis, color change / jaundice  Endocrine:   hot flashes or polydipsia   Neurological:  headache, dizziness, confusion, focal weakness, paresthesia, tremors     Speech difficulties, memory loss, gait difficulty  Psychological: Feelings of anxiety, depression, agitation    Objective:   VITALS:    Visit Vitals    /77    Pulse 82    Temp 98.5 °F (36.9 °C)    Resp 22    Ht 5' 7\" (1.702 m)    Wt 79.4 kg (175 lb)    SpO2 97%    BMI 27.41 kg/m2       PHYSICAL EXAM:    General:    Alert, cooperative, no distress, appears stated age.      HEENT: Atraumatic, anicteric sclerae, pink conjunctivae     No oral ulcers, mucosa moist, throat clear, dentition fair  Neck:  Supple, symmetrical,  thyroid: non tender  Lungs:   Coarse BS  Chest wall:  No tenderness  No Accessory muscle use. Heart:   Regular  rhythm,  No  murmur   No edema  Abdomen:   Soft, non-tender. Not distended. Bowel sounds normal  Extremities: No cyanosis. No clubbing,      Skin turgor normal, Capillary refill normal, Radial  pulse 2+  Skin:     Not pale. Not Jaundiced  No rashes   Psych:  Good insight. Not depressed. Not anxious or agitated. Neurologic: EOMs intact. No facial asymmetry. No aphasia or slurred speech. Symmetrical strength, Sensation grossly intact. Alert and oriented X 4.     _______________________________________________________________________  Care Plan discussed with:    Comments   Patient y    Family      RN y    Care Manager                    Consultant:      _______________________________________________________________________  Expected  Disposition:   Home with Family y   HH/PT/OT/RN    SNF/LTC    MUSHTAQ    ________________________________________________________________________  TOTAL TIME:  54 Minutes    Critical Care Provided     Minutes non procedure based      Comments    y Reviewed previous records   >50% of visit spent in counseling and coordination of care y Discussion with patient and/or family and questions answered       ________________________________________________________________________  Signed: Kenney Leiva MD    Procedures: see electronic medical records for all procedures/Xrays and details which were not copied into this note but were reviewed prior to creation of Plan.     LAB DATA REVIEWED:    Recent Results (from the past 24 hour(s))   CBC WITH AUTOMATED DIFF    Collection Time: 12/04/17 10:07 AM   Result Value Ref Range    WBC 10.5 3.6 - 11.0 K/uL    RBC 4.47 3.80 - 5.20 M/uL    HGB 13.8 11.5 - 16.0 g/dL    HCT 41.2 35.0 - 47.0 %    MCV 92.2 80.0 - 99.0 FL    MCH 30.9 26.0 - 34.0 PG    MCHC 33.5 30.0 - 36.5 g/dL    RDW 12.5 11.5 - 14.5 %    PLATELET 965 309 - 116 K/uL    NEUTROPHILS 74 32 - 75 %    LYMPHOCYTES 19 12 - 49 % MONOCYTES 4 (L) 5 - 13 %    EOSINOPHILS 3 0 - 7 %    BASOPHILS 0 0 - 1 %    ABS. NEUTROPHILS 7.7 1.8 - 8.0 K/UL    ABS. LYMPHOCYTES 2.0 0.8 - 3.5 K/UL    ABS. MONOCYTES 0.5 0.0 - 1.0 K/UL    ABS. EOSINOPHILS 0.4 0.0 - 0.4 K/UL    ABS. BASOPHILS 0.0 0.0 - 0.1 K/UL   METABOLIC PANEL, COMPREHENSIVE    Collection Time: 12/04/17 10:07 AM   Result Value Ref Range    Sodium 137 136 - 145 mmol/L    Potassium 3.6 3.5 - 5.1 mmol/L    Chloride 102 97 - 108 mmol/L    CO2 29 21 - 32 mmol/L    Anion gap 6 5 - 15 mmol/L    Glucose 113 (H) 65 - 100 mg/dL    BUN 13 6 - 20 MG/DL    Creatinine 1.02 0.55 - 1.02 MG/DL    BUN/Creatinine ratio 13 12 - 20      GFR est AA >60 >60 ml/min/1.73m2    GFR est non-AA 59 (L) >60 ml/min/1.73m2    Calcium 8.8 8.5 - 10.1 MG/DL    Bilirubin, total 0.4 0.2 - 1.0 MG/DL    ALT (SGPT) 38 12 - 78 U/L    AST (SGOT) 27 15 - 37 U/L    Alk.  phosphatase 56 45 - 117 U/L    Protein, total 8.3 (H) 6.4 - 8.2 g/dL    Albumin 4.3 3.5 - 5.0 g/dL    Globulin 4.0 2.0 - 4.0 g/dL    A-G Ratio 1.1 1.1 - 2.2     URINALYSIS W/MICROSCOPIC    Collection Time: 12/04/17 10:07 AM   Result Value Ref Range    Color YELLOW/STRAW      Appearance CLOUDY (A) CLEAR      Specific gravity 1.016 1.003 - 1.030      pH (UA) 7.0 5.0 - 8.0      Protein NEGATIVE  NEG mg/dL    Glucose NEGATIVE  NEG mg/dL    Ketone NEGATIVE  NEG mg/dL    Bilirubin NEGATIVE  NEG      Blood NEGATIVE  NEG      Urobilinogen 1.0 0.2 - 1.0 EU/dL    Nitrites NEGATIVE  NEG      Leukocyte Esterase NEGATIVE  NEG      WBC 0-4 0 - 4 /hpf    RBC 0-5 0 - 5 /hpf    Epithelial cells MODERATE (A) FEW /lpf    Bacteria 1+ (A) NEG /hpf    Mucus TRACE (A) NEG /lpf

## 2017-12-05 VITALS
TEMPERATURE: 99 F | SYSTOLIC BLOOD PRESSURE: 109 MMHG | HEIGHT: 67 IN | DIASTOLIC BLOOD PRESSURE: 76 MMHG | RESPIRATION RATE: 18 BRPM | HEART RATE: 79 BPM | BODY MASS INDEX: 27.47 KG/M2 | WEIGHT: 175 LBS | OXYGEN SATURATION: 98 %

## 2017-12-05 LAB
ALBUMIN SERPL-MCNC: 3.6 G/DL (ref 3.5–5)
ALBUMIN/GLOB SERPL: 1 {RATIO} (ref 1.1–2.2)
ALP SERPL-CCNC: 50 U/L (ref 45–117)
ALT SERPL-CCNC: 33 U/L (ref 12–78)
ANION GAP SERPL CALC-SCNC: 8 MMOL/L (ref 5–15)
AST SERPL-CCNC: 24 U/L (ref 15–37)
BASOPHILS # BLD: 0 K/UL (ref 0–0.1)
BASOPHILS NFR BLD: 0 % (ref 0–1)
BILIRUB SERPL-MCNC: 0.5 MG/DL (ref 0.2–1)
BUN SERPL-MCNC: 12 MG/DL (ref 6–20)
BUN/CREAT SERPL: 13 (ref 12–20)
CALCIUM SERPL-MCNC: 8.3 MG/DL (ref 8.5–10.1)
CHLORIDE SERPL-SCNC: 107 MMOL/L (ref 97–108)
CO2 SERPL-SCNC: 23 MMOL/L (ref 21–32)
CREAT SERPL-MCNC: 0.89 MG/DL (ref 0.55–1.02)
EOSINOPHIL # BLD: 0.2 K/UL (ref 0–0.4)
EOSINOPHIL NFR BLD: 2 % (ref 0–7)
ERYTHROCYTE [DISTWIDTH] IN BLOOD BY AUTOMATED COUNT: 12.6 % (ref 11.5–14.5)
ERYTHROCYTE [SEDIMENTATION RATE] IN BLOOD: 106 MM/HR (ref 0–20)
GLOBULIN SER CALC-MCNC: 3.7 G/DL (ref 2–4)
GLUCOSE BLD STRIP.AUTO-MCNC: 110 MG/DL (ref 65–100)
GLUCOSE BLD STRIP.AUTO-MCNC: 96 MG/DL (ref 65–100)
GLUCOSE SERPL-MCNC: 85 MG/DL (ref 65–100)
HCT VFR BLD AUTO: 38.8 % (ref 35–47)
HCYS SERPL-SCNC: 7.9 UMOL/L (ref 3.7–13.9)
HGB BLD-MCNC: 12.9 G/DL (ref 11.5–16)
LYMPHOCYTES # BLD: 2.6 K/UL (ref 0.8–3.5)
LYMPHOCYTES NFR BLD: 30 % (ref 12–49)
MAGNESIUM SERPL-MCNC: 2 MG/DL (ref 1.6–2.4)
MCH RBC QN AUTO: 29.9 PG (ref 26–34)
MCHC RBC AUTO-ENTMCNC: 33.2 G/DL (ref 30–36.5)
MCV RBC AUTO: 90 FL (ref 80–99)
MONOCYTES # BLD: 0.5 K/UL (ref 0–1)
MONOCYTES NFR BLD: 6 % (ref 5–13)
NEUTS SEG # BLD: 5.4 K/UL (ref 1.8–8)
NEUTS SEG NFR BLD: 62 % (ref 32–75)
PLATELET # BLD AUTO: 242 K/UL (ref 150–400)
POTASSIUM SERPL-SCNC: 3.8 MMOL/L (ref 3.5–5.1)
PROT SERPL-MCNC: 7.3 G/DL (ref 6.4–8.2)
RBC # BLD AUTO: 4.31 M/UL (ref 3.8–5.2)
RPR SER QL: NONREACTIVE
SERVICE CMNT-IMP: ABNORMAL
SERVICE CMNT-IMP: NORMAL
SODIUM SERPL-SCNC: 138 MMOL/L (ref 136–145)
VIT B12 SERPL-MCNC: 641 PG/ML (ref 211–911)
WBC # BLD AUTO: 8.7 K/UL (ref 3.6–11)

## 2017-12-05 PROCEDURE — G8987 SELF CARE CURRENT STATUS: HCPCS | Performed by: OCCUPATIONAL THERAPIST

## 2017-12-05 PROCEDURE — 97116 GAIT TRAINING THERAPY: CPT

## 2017-12-05 PROCEDURE — G8988 SELF CARE GOAL STATUS: HCPCS | Performed by: OCCUPATIONAL THERAPIST

## 2017-12-05 PROCEDURE — 99218 HC RM OBSERVATION: CPT

## 2017-12-05 PROCEDURE — 96372 THER/PROPH/DIAG INJ SC/IM: CPT

## 2017-12-05 PROCEDURE — 82962 GLUCOSE BLOOD TEST: CPT

## 2017-12-05 PROCEDURE — 96361 HYDRATE IV INFUSION ADD-ON: CPT

## 2017-12-05 PROCEDURE — 85652 RBC SED RATE AUTOMATED: CPT | Performed by: PSYCHIATRY & NEUROLOGY

## 2017-12-05 PROCEDURE — 97161 PT EVAL LOW COMPLEX 20 MIN: CPT

## 2017-12-05 PROCEDURE — G8989 SELF CARE D/C STATUS: HCPCS | Performed by: OCCUPATIONAL THERAPIST

## 2017-12-05 PROCEDURE — 83090 ASSAY OF HOMOCYSTEINE: CPT | Performed by: PSYCHIATRY & NEUROLOGY

## 2017-12-05 PROCEDURE — 74011250637 HC RX REV CODE- 250/637: Performed by: INTERNAL MEDICINE

## 2017-12-05 PROCEDURE — 93306 TTE W/DOPPLER COMPLETE: CPT

## 2017-12-05 PROCEDURE — 96376 TX/PRO/DX INJ SAME DRUG ADON: CPT

## 2017-12-05 PROCEDURE — 74011250636 HC RX REV CODE- 250/636: Performed by: INTERNAL MEDICINE

## 2017-12-05 PROCEDURE — 82607 VITAMIN B-12: CPT | Performed by: PSYCHIATRY & NEUROLOGY

## 2017-12-05 PROCEDURE — 93880 EXTRACRANIAL BILAT STUDY: CPT

## 2017-12-05 PROCEDURE — 82306 VITAMIN D 25 HYDROXY: CPT | Performed by: PSYCHIATRY & NEUROLOGY

## 2017-12-05 PROCEDURE — 80053 COMPREHEN METABOLIC PANEL: CPT | Performed by: INTERNAL MEDICINE

## 2017-12-05 PROCEDURE — 97165 OT EVAL LOW COMPLEX 30 MIN: CPT | Performed by: OCCUPATIONAL THERAPIST

## 2017-12-05 PROCEDURE — 85025 COMPLETE CBC W/AUTO DIFF WBC: CPT | Performed by: INTERNAL MEDICINE

## 2017-12-05 PROCEDURE — 74011250636 HC RX REV CODE- 250/636: Performed by: PSYCHIATRY & NEUROLOGY

## 2017-12-05 PROCEDURE — 36415 COLL VENOUS BLD VENIPUNCTURE: CPT | Performed by: INTERNAL MEDICINE

## 2017-12-05 PROCEDURE — 83735 ASSAY OF MAGNESIUM: CPT | Performed by: INTERNAL MEDICINE

## 2017-12-05 RX ORDER — BUTALBITAL, ACETAMINOPHEN AND CAFFEINE 50; 325; 40 MG/1; MG/1; MG/1
1 TABLET ORAL
Status: DISCONTINUED | OUTPATIENT
Start: 2017-12-05 | End: 2017-12-05 | Stop reason: HOSPADM

## 2017-12-05 RX ORDER — BUTALBITAL, ACETAMINOPHEN AND CAFFEINE 50; 325; 40 MG/1; MG/1; MG/1
1 TABLET ORAL
Qty: 20 TAB | Refills: 0 | Status: SHIPPED | OUTPATIENT
Start: 2017-12-05

## 2017-12-05 RX ORDER — CLONAZEPAM 0.5 MG/1
0.5 TABLET ORAL 2 TIMES DAILY
Status: DISCONTINUED | OUTPATIENT
Start: 2017-12-05 | End: 2017-12-05 | Stop reason: HOSPADM

## 2017-12-05 RX ORDER — CLONAZEPAM 0.5 MG/1
0.5 TABLET ORAL 2 TIMES DAILY
Qty: 20 TAB | Refills: 0 | Status: SHIPPED | OUTPATIENT
Start: 2017-12-05 | End: 2017-12-13 | Stop reason: SDUPTHER

## 2017-12-05 RX ADMIN — NITROFURANTOIN (MONOHYDRATE/MACROCRYSTALS) 100 MG: 75; 25 CAPSULE ORAL at 08:36

## 2017-12-05 RX ADMIN — SODIUM CHLORIDE 75 ML/HR: 900 INJECTION, SOLUTION INTRAVENOUS at 07:27

## 2017-12-05 RX ADMIN — NITROFURANTOIN (MONOHYDRATE/MACROCRYSTALS) 100 MG: 75; 25 CAPSULE ORAL at 17:08

## 2017-12-05 RX ADMIN — BUTALBITAL, ACETAMINOPHEN AND CAFFEINE 1 TABLET: 50; 325; 40 TABLET ORAL at 13:01

## 2017-12-05 RX ADMIN — LORAZEPAM 0.5 MG: 2 INJECTION INTRAMUSCULAR; INTRAVENOUS at 00:00

## 2017-12-05 RX ADMIN — OXYCODONE HYDROCHLORIDE AND ACETAMINOPHEN 1 TABLET: 5; 325 TABLET ORAL at 06:40

## 2017-12-05 RX ADMIN — CLONAZEPAM 0.5 MG: 0.5 TABLET ORAL at 17:08

## 2017-12-05 RX ADMIN — LORAZEPAM 0.5 MG: 2 INJECTION INTRAMUSCULAR; INTRAVENOUS at 06:18

## 2017-12-05 RX ADMIN — Medication 10 ML: at 12:59

## 2017-12-05 RX ADMIN — ASPIRIN 81 MG 81 MG: 81 TABLET ORAL at 08:36

## 2017-12-05 RX ADMIN — CLONAZEPAM 0.5 MG: 0.5 TABLET ORAL at 12:58

## 2017-12-05 RX ADMIN — Medication 10 ML: at 06:21

## 2017-12-05 NOTE — PROGRESS NOTES
[facility] Vascular  Preliminary Report:  Carotid Duplex Scan    Right:  No plaque noted in the right carotid system. Right ICA velocities suggest 0% diameter reduction. Right vertebral artery flow is antegrade. Left:  No plaque noted in the left carotid system. Left ICA velocities suggest 0% diameter reduction. Left vertebral artery flow is antegrade. Final report to follow.

## 2017-12-05 NOTE — DISCHARGE INSTRUCTIONS
HOSPITALIST DISCHARGE INSTRUCTIONS    NAME: Shoshana Holstein   :  1970   MRN:  [id number]     Date/Time:  2017 3:35 PM    ADMIT DATE: 2017   DISCHARGE DATE: 2017         · It is important that you take the medication exactly as they are prescribed. · Keep your medication in the bottles provided by the pharmacist and keep a list of the medication names, dosages, and times to be taken in your wallet. · Do not take other medications without consulting your doctor. What to do at Home    Recommended diet:  Cardiac Diet    Recommended activity: Activity as tolerated    Check BP at home 3x /day, keep log, call PCP if BP > 150/90 or < 100/60. Discuss with PCP resuming BP meds      If you have questions regarding the hospital related prescriptions or hospital related issues please call [facility] at [phone]. You can always direct your questions to your primary care doctor if you are unable to reach your hospital physician; your PCP works as an extension of your hospital doctor just like your hospital doctor is an extension of your PCP for your time at the hospital [facility])    If you experience any of the following symptoms then please call your primary care physician or return to the emergency room if you cannot get hold of your doctor:    Fever, chills, nausea, vomiting, or persistent diarrhea  Worsening weakness or new problems with your speech or balance  Dark stools or visible blood in your stools  New Leg swelling or shortness of breath as these could be signs of a clot    Additional Instructions:      Bring these papers with you to your follow up appointments. The papers will help your doctors be sure to continue the care plan from the hospital.              Information obtained by :  I understand that if any problems occur once I am at home I am to contact my physician. I understand and acknowledge receipt of the instructions indicated above. Physician's or R.N.'s Signature                                                                  Date/Time                                                                                                                                              Patient or Representative Signature

## 2017-12-05 NOTE — PROGRESS NOTES
Patient discharge instructions reviewed. Opportunity offered for questions and clarification. No questions asked at this time. Follow- up appointments scheduled and reviewed with patient at this time as well. Patient ambulated with nurse to ER entrance for discharge with family.

## 2017-12-05 NOTE — PROGRESS NOTES
physical Therapy neuro EVALUATION/discharge     Patient: Monik Mccurdy (9 y.o. female)  Date: 12/5/2017  Primary Diagnosis: Paresthesia        Precautions:     ASSESSMENT :  Based on the objective data described below, the patient presents was admitted with reports of L sided facial numbness, L UE weakness and tremors since 12/2/17. Brain MRI and Head CT: normal. Patient received supine in bed and agreeable to therapy. Patient reported she still has some muscle twitching in LUE and LLE that is greater at rest than during mobility. Patient reported being independent, working full time job, lives with her son, has a supportive boyfriend. Patient completed supine to sit independently, sit<>stand without AD independently and ambulated in the hallway and completed stair training with no LOB or gait abnormalities. Patient scored a 54/56 on the Rolon Balance Score indicating low fall risk. Patient educated on BEFAST acronym for signs and symptoms of a stroke and verbalized understanding. Patient does not require any further acute PT needs at this time. Pt is encouraged to remain OOB and ambulate as tolerated with RN staff. Will complete order. Skilled physical therapy is not indicated at this time. PLAN :  Discharge Recommendations: None  Further Equipment Recommendations for Discharge: none       SUBJECTIVE:   Patient stated This could all be happening because my job is so stressful.     OBJECTIVE DATA SUMMARY:   HISTORY:    Past Medical History:   Diagnosis Date    Hypertension      Past Surgical History:   Procedure Laterality Date    HX CHOLECYSTECTOMY      HX ORTHOPAEDIC      herniated disc     HX UROLOGICAL      URETHRAL REPAIR     Prior Level of Function/Home Situation: independent, ambulatory, employed, drives  Personal factors and/or comorbidities impacting plan of care:     Home Situation  Home Environment: Private residence  # Steps to Enter: 5  Rails to Enter: Yes  One/Two Story Residence: [facility] story  Living Alone: No  Support Systems: Child(kassidy)  Patient Expects to be Discharged to[de-identified] Private residence  Current DME Used/Available at Home: None    EXAMINATION/PRESENTATION/DECISION MAKING:   Critical Behavior:  Neurologic State: Alert  Orientation Level: Oriented X4  Cognition: Decreased attention/concentration, Appropriate for age attention/concentration, Appropriate decision making     Hearing:     Skin:    Edema:   Range Of Motion:  AROM: Within functional limits           PROM: Within functional limits           Strength:    Strength:  Within functional limits                    Tone & Sensation:                  Sensation: Impaired (c/o L side face tingling; more dull on LUE/LLE)               Coordination:  Coordination: Within functional limits  Vision:      Functional Mobility:  Bed Mobility:     Supine to Sit: Independent  Sit to Supine: Independent     Transfers:  Sit to Stand: Independent  Stand to Sit: Independent                       Balance:   Sitting: Intact  Standing: Intact  Ambulation/Gait Training:  Distance (ft): 200 Feet (ft)  Assistive Device: Gait belt  Ambulation - Level of Assistance: Supervision                       Speed/Allison: Fluctuations  Step Length: Right shortened;Left shortened                    Therapeutic Exercises:       Functional Measure:  Rolon Balance Test:    Sitting to Standin  Standing Unsupported: 4  Sitting with Back Unsupported: 4  Standing to Sittin  Transfers: 4  Standing Unsupported with Eyes Closed: 4  Standing Unsupported with Feet Together: 4  Reach Forward with Outstretched Arm: 4   Object: 4  Turn to Look Over Shoulders: 4  Turn 360 Degrees: 4  Alternate Foot on Step/Stool: 4  Standing Unsupported One Foot in Front: 3  Stand on One Leg: 3  Total: 54         56=Maximum possible score;   0-20=High fall risk  21-40=Moderate fall risk   41-56=Low fall risk     Rolon Balance Test and G-code impairment scale:  Percentage of Impairment CH    0%   CI    1-19% CJ    20-39% CK    40-59% CL    60-79% CM    80-99% CN     100%   Rolon   Score 0-56 56 45-55 34-44 23-33 12-22 1-11 0     G codes: In compliance with CMSs Claims Based Outcome Reporting, the following G-code set was chosen for this patient based on their primary functional limitation being treated: The outcome measure chosen to determine the severity of the functional limitation was the [facility] with a score of 54/56 which was correlated with the impairment scale. ? Mobility - Walking and Moving Around:     - CURRENT STATUS: CI - 1%-19% impaired, limited or restricted    - GOAL STATUS: CH - 0% impaired, limited or restricted    - D/C STATUS:  CI - 1%-19% impaired, limited or restricted      Based on the above components, the patient evaluation is determined to be of the following complexity level: LOW     Pain:Pain Scale 1: Numeric (0 - 10)  Pain Intensity 1: 3  Pain Location 1: Head        Pain Intervention(s) 1: Relaxation technique;Repositioned  Activity Tolerance:   Good. VSS  Please refer to the flowsheet for vital signs taken during this treatment. After treatment:   []         Patient left in no apparent distress sitting up in chair  [x]         Patient left in no apparent distress in bed  [x]         Call bell left within reach  [x]         Nursing notified  []         Caregiver present  []         Bed alarm activated    COMMUNICATION/EDUCATION:   Patient was educated regarding Her deficit(s) of L facial numbness, LUE/LLE weakness as this relates to Her admission to r/o CVA. She demonstrated Excellent understanding as evidenced by verbal teach back. Patient and/or family was verbally educated on the BE FAST acronym for signs/symptoms of CVA and TIA. BE FAST was written on patient's communication board  for visual education and reinforcement. All questions answered with patient indicating good understanding.      [x]   Fall prevention education was provided and the patient/caregiver indicated understanding. [x]   Patient/family have participated as able and agree with findings and recommendations. []   Patient is unable to participate in plan of care at this time.     Findings and recommendations were discussed with: Occupational Therapist and Registered Nurse    Thank you for this referral.  Zeynep Snell, PT, DPT   Time Calculation: 17 mins

## 2017-12-05 NOTE — PROGRESS NOTES
Bedside and Verbal shift change report given to TEDDY Preston (oncoming nurse) by Becki Myers RN (offgoing nurse). Report included the following information SBAR, Kardex, Intake/Output, MAR and Recent Results.

## 2017-12-05 NOTE — DISCHARGE SUMMARY
Hospitalist Discharge Summary     Patient ID:  Jeffery Real  [id number]  41 y.o.  1970    PCP on record: Mya Dewitt MD    Admit date: 12/4/2017  Discharge date and time: 12/5/2017      DISCHARGE DIAGNOSIS:  Paresthesias and twitching of lt-side resolved  Likely secondary to anxiety, workup so far negative   MRI/MRA brain wnl  Carotid US nl   Echo 65 % to 70 %. EEG- per nursing Dr. Fernandez Dietrich cleared pt for discharge as EEG is nl  Started on klonopin, psychiatry f/u OP no SI/HI     HTN:  holding home meds lisinopril and HCTZ for low BP, resume as indicated     UTI: c/w Macrobid     Code Status: Full Code     Surrogate Decision Maker: mother Silvio Meter [phone]  DVT Prophylaxis: lovenox  GI Prophylaxis: not indicated  Baseline: Independent full ADL        Body mass index is 27.41 kg/(m^2). CONSULTATIONS:  IP CONSULT TO NEUROLOGY  IP CONSULT TO PSYCHIATRY    Excerpted HPI from H&P of Carrie Jackson MD:  Edward Nicholson is a 41 y.o.  female  with PMHx significant for HTN, presents ambulatory to the ED with cc of intermittent left sided facial numbness, LUE weakness and tremors to the left upper and lower extremities since 12/2/2017. Pt also reports that she feels a heavy sensation to her chest. She states that her sx's worsen when she lays flat and improves when she stands up. She reports taking Aleve and Advil PM but denies any relief these medications. She reports that she was seen by her OBGYN on 12/1/2017. She states she was diagnosed with a UTI and prescribed Macrobid BID. She reports experiencing tremors in the past, noting her PCP believed that this was related to an adverse reaction to Paxil at that time. As a result she notes concern about whether her sx's today could be related to her taking Macrobid. However she states she's taken this medication in the past with no complications.  She specifically denies any fevers, chills, nausea, vomiting, shortness of breath, headache, rash, diarrhea, sweating or weight loss. At this time patient is lying in bed c/o tremors, left face numbness, dizziness, weakness, denies chest pain, no SOB, no fever, no cough, no N/V no diarrhea, no other associated symptoms. We were asked to admit for work up and evaluation of the above problems. ______________________________________________________________________  DISCHARGE SUMMARY/HOSPITAL COURSE:  for full details see H&P, daily progress notes, labs, consult notes. _______________________________________________________________________  Patient seen and examined by me on discharge day. Pertinent Findings:  Gen:    Not in distress  Chest: Clear lungs  CVS:   Regular rhythm. No edema  Abd:  Soft, not distended, not tender  Neuro:  Alert, awake, oriented grossly intact  _______________________________________________________________________  DISCHARGE MEDICATIONS:   Current Discharge Medication List      START taking these medications    Details   clonazePAM (KLONOPIN) 0.5 mg tablet Take 1 Tab by mouth two (2) times a day. Max Daily Amount: 1 mg. Qty: 20 Tab, Refills: 0      butalbital-acetaminophen-caffeine (FIORICET, ESGIC) -40 mg per tablet Take 1 Tab by mouth every six (6) hours as needed for Headache. Max Daily Amount: 4 Tabs. Qty: 20 Tab, Refills: 0         CONTINUE these medications which have NOT CHANGED    Details   nitrofurantoin, macrocrystal-monohydrate, (MACROBID) 100 mg capsule Take 100 mg by mouth two (2) times a day. Ascorbic Acid-Multivits-Min (EMERGEN-C) 1,000 mg pwep Take 1 Packet by mouth nightly as needed. MELATONIN PO Take 3 Tabs by mouth nightly as needed (sleep aid). ondansetron (ZOFRAN ODT) 4 mg disintegrating tablet Take 1 Tab by mouth every eight (8) hours as needed for Nausea. Qty: 10 Tab, Refills: 0      atorvastatin (LIPITOR) 20 mg tablet Take 20 mg by mouth daily.          STOP taking these medications lisinopril-hydroCHLOROthiazide (PRINZIDE, ZESTORETIC) 20-12.5 mg per tablet Comments:   Reason for Stopping:         Ibuprofen-diphenhydrAMINE (ADVIL PM) 200-38 mg tab Comments:   Reason for Stopping:               My Recommended Diet, Activity, Wound Care, and follow-up labs are listed in the patient's Discharge Insturctions which I have personally completed and reviewed. ______________________________________________________________________    Risk of deterioration: Low    Condition at Discharge:  Stable  ______________________________________________________________________    Disposition  Home with family, no needs  ______________________________________________________________________    Care Plan discussed with:   Patient, Family, RN, Care Manager, Consultant      ______________________________________________________________________    Code Status: Full Code  ______________________________________________________________________      Follow up with:   PCP : Starla Curry MD  Follow-up Information     Follow up With Details Comments Contact Info    Alisson Woo MD On 1/16/2018 You will see his nurse practioner Lita Fernandes at 9:30 am for 10:00 am appointment with nurse practioner-please bring new patient paperwork you received by mail, co-pay, insurance card and photo id.  [address] [address] [address]  [facility].  [phone]      Starla Curry MD In 1 week arrive at 10:45 am for 11:15am appointment.   Please bring photo id, insurance card, your co-pay amount and a list of your medicaitions [address] [facility]  [address]  [phone]  [address] [place] [address] In 1 week anxiety [address] [phone]                Total time in minutes spent coordinating this discharge (includes going over instructions, follow-up, prescriptions, and preparing report for sign off to her PCP) :  60 minutes    Signed:  Rishi Lake MD

## 2017-12-05 NOTE — PROGRESS NOTES
Problem: Falls - Risk of  Goal: *Absence of Falls  Document Narendra Fall Risk and appropriate interventions in the flowsheet.    Fall Risk Interventions:            Medication Interventions: Evaluate medications/consider consulting pharmacy, Patient to call before getting OOB, Teach patient to arise slowly    Elimination Interventions: Call light in reach, Bed/chair exit alarm, Patient to call for help with toileting needs

## 2017-12-05 NOTE — PROGRESS NOTES
Speech pathology note  Reviewed chart and note MRI revealed no acute infarct. Also note patient passed the STAND and a regular diet was ordered. Discussed case with RN who reports no SLP related concerns, including no concerns with swallowing, language, speech, or cognition. Formal SLP evaluation not clinically indicated at this time. Will sign off. Please re-consult if further needs arise. Thank you.     Dom Christianson., CCC-SLP

## 2017-12-05 NOTE — ROUTINE PROCESS
* No surgery found *  * No surgery found *  Bedside and Verbal shift change report given to Bonnie RN (oncoming nurse) by Federica Levi RN (offgoing nurse). Report included the following information SBAR, Kardex, MAR and Recent Results. Zone Phone:   [phone]      Significant changes during shift:  none        Patient Information    Shoshana Holstein  41 y.o.  12/4/2017  9:47 AM by Viktoriya Quintana MD. Shoshana Holstein was admitted from Home    Problem List    Patient Active Problem List    Diagnosis Date Noted    Hypertension 12/04/2017    Hyperlipidemia 12/04/2017    Paresthesia 12/04/2017    Transient ischemic attack involving right internal carotid artery 12/04/2017    Bilateral carotid artery stenosis 12/04/2017    Nonintractable epilepsy with complex partial seizures ([facility] [address] 12/04/2017    Altered mental status, unspecified 12/04/2017    Left sided numbness 12/04/2017    Cervical post-laminectomy syndrome 12/04/2017     Past Medical History:   Diagnosis Date    Hypertension          Core Measures:    CVA: Yes Yes  CHF:No Not applicable  PNA:No Not applicable    Activity Status:    OOB to Chair No  Ambulated this shift Yes in room  Bed Rest No    Supplemental O2: (If Applicable)    NC No  NRB No  Venti-mask No  On room air Liters/min      LINES AND DRAINS:    Central Line? No     PICC LINE? No     Urinary Catheter? No     DVT prophylaxis:    DVT prophylaxis Med- Yes  DVT prophylaxis SCD or JEAN PAUL- No     Wounds: (If Applicable)    Wounds- No    Location none    Patient Safety:    Falls Score Total Score: 1  Safety Level_______  Bed Alarm On? No  Sitter?  No    Plan for upcoming shift: EEG        Discharge Plan: No just admitted    Active Consults:  IP CONSULT TO NEUROLOGY

## 2017-12-05 NOTE — PROGRESS NOTES
Hospitalist Progress Note    NAME: Nelson Adkins   :  1970   MRN:  [id number]       Assessment / Plan:  Paresthesias and twitching of lt-side, r/o seizure  MRI/MRA brain wnl  Carotid US nl   Echo, EEG pending. Cont tele monitoring    HTN:  holding home meds lisinopril and HCTZ for low BP, resume as indicated    UTI: c/w Macrobid    Code Status: Full Code    Surrogate Decision Maker: mother Dwayne Umanzor2 [id number]  DVT Prophylaxis: lovenox  GI Prophylaxis: not indicated  Baseline: Independent full ADL      Body mass index is 27.41 kg/(m^2). Subjective:     Chief Complaint / Reason for Physician Visit  \"tingling and twitching lt-side of body\". Discussed with RN events overnight. Review of Systems:  Symptom Y/N Comments  Symptom Y/N Comments   Fever/Chills n   Chest Pain n    Poor Appetite n   Edema n    Cough n   Abdominal Pain n    Sputum n   Joint Pain     SOB/LOYOLA n   Pruritis/Rash     Nausea/vomit n   Tolerating PT/OT     Diarrhea n   Tolerating Diet     Constipation    Other       Could NOT obtain due to:      Objective:     VITALS:   Last 24hrs VS reviewed since prior progress note. Most recent are:  Patient Vitals for the past 24 hrs:   Temp Pulse Resp BP SpO2   17 1127 98.5 °F (36.9 °C) 78 18 109/74 98 %   17 0836 - 77 - 102/64 -   17 0730 98.5 °F (36.9 °C) 76 18 109/74 96 %   17 0441 98 °F (36.7 °C) 66 18 95/60 98 %   17 2337 98.8 °F (37.1 °C) 80 18 103/64 96 %   17 2020 98.5 °F (36.9 °C) 80 18 120/70 95 %   17 1604 98.2 °F (36.8 °C) 80 18 109/74 95 %   17 1402 98 °F (36.7 °C) 77 18 113/72 97 %     No intake or output data in the 24 hours ending 17 1302     PHYSICAL EXAM:  General: WD, WN. Alert, cooperative, no acute distress    EENT:  EOMI. Anicteric sclerae. MMM  Resp:  CTA bilaterally, no wheezing or rales.   No accessory muscle use  CV:  Regular  rhythm,  No edema  GI:  Soft, Non distended, Non tender.  +Bowel sounds  Neurologic:  Alert and oriented X 3, normal speech, grossly intact, no deficits  Psych:   Good insight. Not anxious nor agitated  Skin:  No rashes. No jaundice    Reviewed most current lab test results and cultures  YES  Reviewed most current radiology test results   YES  Review and summation of old records today    NO  Reviewed patient's current orders and MAR    YES  PMH/SH reviewed - no change compared to H&P  ________________________________________________________________________  Care Plan discussed with:    Comments   Patient x    Family      RN x    Care Manager x    Consultant  x Dr Olayinka Carballo team rounds were held today with , nursing, pharmacist and clinical coordinator. Patient's plan of care was discussed; medications were reviewed and discharge planning was addressed. ________________________________________________________________________  Total NON critical care TIME:  30   Minutes    Total CRITICAL CARE TIME Spent:   Minutes non procedure based      Comments   >50% of visit spent in counseling and coordination of care     ________________________________________________________________________  Maria Isabel Moody MD     Procedures: see electronic medical records for all procedures/Xrays and details which were not copied into this note but were reviewed prior to creation of Plan. LABS:  I reviewed today's most current labs and imaging studies.   Pertinent labs include:  Recent Labs      12/05/17 0448  12/04/17   1007   WBC  8.7  10.5   HGB  12.9  13.8   HCT  38.8  41.2   PLT  242  283     Recent Labs      12/05/17   0448  12/04/17   1007   NA  138  137   K  3.8  3.6   CL  107  102   CO2  23  29   GLU  85  113*   BUN  12  13   CREA  0.89  1.02   CA  8.3*  8.8   MG  2.0   --    ALB  3.6  4.3   TBILI  0.5  0.4   SGOT  24  27   ALT  33  38       Signed: Maria Isabel Moody MD

## 2017-12-05 NOTE — PROGRESS NOTES
Occupational Therapy neurological EVALUATION with discharge  Patient: Faina Rubin (18 y.o. female)  Date: 12/5/2017  Primary Diagnosis: Paresthesia        Precautions:  Fall    ASSESSMENT:  Based on the objective data described below, the patient presents at her baseline independent level for all basic self-care and IADL. She was admitted with L sided facial numbness, L UE weakness, and tremors. None of these symptoms were present during OT evaluation. She does report that she still has muscle \"twitches\" that affect her L UE and LE (UE>LE). She admits to a lot of stress at work. Fugl-Myrick score is 66/66 indicating no UE deficits. Further skilled acute occupational therapy is not indicated at this time. Discharge Recommendations: None  Further Equipment Recommendations for Discharge: None     SUBJECTIVE:   Patient stated I wish the medicine would last longer than an hour.  (for muscle twitches)    OBJECTIVE DATA SUMMARY:   HISTORY:   Past Medical History:   Diagnosis Date    Hypertension      Past Surgical History:   Procedure Laterality Date    HX CHOLECYSTECTOMY      HX ORTHOPAEDIC      herniated disc     HX UROLOGICAL      URETHRAL REPAIR       Prior Level of Function/Environment/Context: Independent with ADL and IADL. Works full time. Drives  Expanded or extensive additional review of patient history:     Home Situation  Home Environment: Private residence  # Steps to Enter: 5  Rails to Enter: Yes  One/Two Story Residence: Two story  Living Alone: No  Support Systems: Child(kassidy)  Patient Expects to be Discharged to[de-identified] Private residence  Current DME Used/Available at Home: None  Tub or Shower Type: Tub/Shower combination  [x]  Right hand dominant   []  Left hand dominant    EXAMINATION OF PERFORMANCE DEFICITS:  Cognitive/Behavioral Status:  Neurologic State: Alert  Orientation Level: Oriented X4  Cognition: Appropriate decision making; Follows commands; Appropriate safety awareness  Perception: Appears intact  Perseveration: No perseveration noted  Safety/Judgement: Awareness of environment;Good awareness of safety precautions; Insight into deficits    Skin: No visible issues    Edema: None    Hearing:  Within normal limits    Vision/Perceptual:    Not assessed; no acute changes reported   Able to read clock and dry erase board without difficulty                              Range of Motion:  AROM: Within functional limits  PROM: Within functional limits                      Strength:  Strength: Within functional limits                Coordination:  Coordination: Within functional limits  Fine Motor Skills-Upper: Left Intact; Right Intact    Gross Motor Skills-Upper: Left Intact; Right Intact    Tone & Sensation:     Sensation: Intermittently mpaired (c/o L side face tingling; more dull on LUE/LLE)                      Balance:  Sitting: Intact  Standing: Intact    Functional Mobility and Transfers for ADLs:  Bed Mobility:  Supine to Sit: Independent  Sit to Supine: Independent    Transfers:  Sit to Stand: Independent  Functional Transfers  Toilet Transfer : Independent  Tub Transfer: Independent    ADL Assessment:  Feeding: Independent    Oral Facial Hygiene/Grooming: Independent    Bathing: Independent    Upper Body Dressing: Independent    Lower Body Dressing: Independent    Toileting: Independent                Functional Measure:   Fugl-Myrick Assessment of Motor Recovery after Stroke:     Reflex Activity  Flexors/Biceps/Fingers: Can be elicited  Extensors/Triceps: Can be elicited  Reflex Subtotal: 4    Volitional Movement Within Synergies  Shoulder Retraction: Full  Shoulder Elevation: Full  Shoulder Abduction (90 degrees): Full  Shoulder External Rotation: Full  Elbow Flexion: Full  Forearm Supination: Full  Shoulder Adduction/Internal Rotation: Full  Elbow Extension: Full  Forearm Pronation: Full  Subtotal: 18    Volitional Movement Mixing Synergies  Hand to Lumbar Spine: Full  Shoulder Flexion (0-90 degrees): Full  Pronation-Supination: Full  Subtotal: 6    Volitional Movement With Little or No Synergy  Shoulder Abduction (0-90 degrees): Full  Shoulder Flexion ( degrees): Full  Pronation/Supination: Full  Subtotal : 6    Normal Reflex Activity  Biceps, Triceps, Finger Flexors: Full  Subtotal : 2    Upper Extremity Total   Upper Extremity Total: 36    Wrist  Stability at 15 Degree Dorsiflexion: Full  Repeated Dorsiflexion/ Volar Flexion: Full  Stability at 15 Degree Dorsiflexion: Full  Repeated Dorsiflexion/ Volar Flexion: Full  Circumduction: Full  Wrist Total: 10    Hand  Mass Flexion: Full  Mass Extension: Full  Grasp A: Full  Grasp B: Full  Grasp C: Full  Grasp D: Full  Grasp E: Full  Hand Total: 14    Coordination/Speed  Tremor: None  Dysmetria: None  Time: <1s  Coordination/Speed Total : 6    Total A-D  Total A-D (Motor Function): 66/66     Percentage of impairment CH  0% CI  1-19% CJ  20-39% CK  40-59% CL  60-79% CM  80-99% CN  100%   Fugl-Myrick score: 0-66 66 53-65 39-52 26-38 13-25 1-12   0      This is a reliable/valid measure of arm function after a neurological event. It has established value to characterize functional status and for measuring spontaneous and therapy-induced recovery; tests proximal and distal motor functions. Fugl-Myrick Assessment - UE scores recorded between five and 30 days post neurologic event can be used to predict UE recovery at six months post neurologic event. Severe = 0-21 points   Moderately Severe = 22-33 points   Moderate = 34-47 points   Mild = 48-66 points  GABRIEL Newell, DANYA Abbasi, & DONALD Chanel (1992). Measurement of motor recovery after stroke: Outcome assessment and sample size requirements.  Stroke, 23, pp. 8079-8233.   ------------------------------------------------------------------------------------------------------------------------------------------------------------------  MCID:  Stroke:   Monik Hassan, 2001; n = 171; mean age 79 (5) years; assessed within 16 (15) days of stroke, Acute Stroke)  FMA Motor Scores from Admission to Discharge   10 point increase in FMA Upper Extremity = 1.5 change in discharge FIM   10 point increase in FMA Lower Extremity = 1.9 change in discharge FIM  MDC:   Stroke:   Yair Keith et al, 2008, n = 14, mean age = 59.9 (14.6) years, assessed on average 14 (6.5) months post stroke, Chronic Stroke)   FMA = 5.2 points for the Upper Extremity portion of the assessment       Barthel Index:    Bathin  Bladder: 10  Bowels: 10  Groomin  Dressing: 10  Feeding: 10  Mobility: 15  Stairs: 10  Toilet Use: 10  Transfer (Bed to Chair and Back): 15  Total: 100       Barthel and G-code impairment scale:  Percentage of impairment CH  0% CI  1-19% CJ  20-39% CK  40-59% CL  60-79% CM  80-99% CN  100%   Barthel Score 0-100 100 99-80 79-60 59-40 20-39 1-19   0   Barthel Score 0-20 20 17-19 13-16 9-12 5-8 1-4 0      The Barthel ADL Index: Guidelines  1. The index should be used as a record of what a patient does, not as a record of what a patient could do. 2. The main aim is to establish degree of independence from any help, physical or verbal, however minor and for whatever reason. 3. The need for supervision renders the patient not independent. 4. A patient's performance should be established using the best available evidence. Asking the patient, friends/relatives and nurses are the usual sources, but direct observation and common sense are also important. However direct testing is not needed. 5. Usually the patient's performance over the preceding 24-48 hours is important, but occasionally longer periods will be relevant. 6. Middle categories imply that the patient supplies over 50 per cent of the effort. 7. Use of aids to be independent is allowed. Miky Matos., Barthel, D.W. (0763). Functional evaluation: the Barthel Index. [facility] (14)2.   JOSELIN Dela Cruz, Macy Beckman., Shun Ladd., Jalil Sinclair. (1999). Measuring the change indisability after inpatient rehabilitation; comparison of the responsiveness of the Barthel Index and Functional Spring Measure. Journal of Neurology, Neurosurgery, and Psychiatry, 66(4), 535-042. BAIRON Angeles, CATRINA Plunkett, & Edwardo Salinas M.A. (2004.) Assessment of post-stroke quality of life in cost-effectiveness studies: The usefulness of the Barthel Index and the EuroQoL-5D. Quality of Life Research, 13, 397-01       G codes: In compliance with CMSs Claims Based Outcome Reporting, the following G-code set was chosen for this patient based on their primary functional limitation being treated: The outcome measure chosen to determine the severity of the functional limitation was the Barthel Index with a score of 100/100 which was correlated with the impairment scale. ?  Self Care:     - CURRENT STATUS: CH - 0% impaired, limited or restricted    - GOAL STATUS: CH - 0% impaired, limited or restricted    - D/C STATUS:  CH - 0% impaired, limited or restricted        Occupational Therapy Evaluation Charge Determination   History Examination Decision-Making   LOW Complexity : Brief history review  LOW Complexity : 1-3 performance deficits relating to physical, cognitive , or psychosocial skils that result in activity limitations and / or participation restrictions  LOW Complexity : No comorbidities that affect functional and no verbal or physical assistance needed to complete eval tasks       Based on the above components, the patient evaluation is determined to be of the following complexity level: LOW     Pain:Pain Scale 1: Numeric (0 - 10)  Pain Intensity 1: 3  Pain Location 1: Head  Pain Intervention(s) 1: Relaxation technique;Repositioned     Activity Tolerance:   Excellent  After treatment:   []  Patient left in no apparent distress sitting up in chair  [x]  Patient left in no apparent distress in bed  [x]  Call bell left within reach  [x]  Nursing notified  []  Caregiver present  []  Bed alarm activated    COMMUNICATION/EDUCATION:   Findings and recommendations were discussed with: Physical Therapist and Registered Nurse    Patient and/or family was verbally educated on the BE FAST acronym for signs/symptoms of CVA and TIA. BE FAST was written on patient's communication board  for visual education and reinforcement. All questions answered with patient indicating good understanding. [x]      Home safety education was provided and the patient/caregiver indicated understanding. [x]      Patient/family have participated as able and agree with findings and recommendations. []      Patient is unable to participate in plan of care at this time.     Thank you for this referral.  Suly Pulliam, OTR/L  Time Calculation: 19 mins

## 2017-12-05 NOTE — ROUTINE PROCESS
Patient requested something to help her sleep- said she takes melatonin sometimes at home. Paged on-call hospitalist.  Dr Kareen Myers responded. Gave SBAR including patient's request.  Dr Kareen Myers put in order for melatonin 3 mg PO PRN at bedtime.

## 2017-12-05 NOTE — PROCEDURES
[facility]  *** FINAL REPORT ***    Name: Jessica Desouza  MRN: [id number]    Inpatient  : 01 Sep 1970  HIS Order #: [id number]  [address] [facility] Visit #: [id number]  Date: 05 Dec 2017    TYPE OF TEST: Cerebrovascular Duplex    REASON FOR TEST  CVA    Right Carotid:-             Proximal               Mid                 Distal  cm/s  Systolic  Diastolic  Systolic  Diastolic  Systolic  Diastolic  CCA:     14.2      25.0                            94.0      25.0  Bulb:  ECA:     87.0      21.0  ICA:     75.0      22.0       72.0      31.0       63.0      30.0  ICA/CCA:  0.8       0.9    ICA Stenosis: Normal    Right Vertebral:-  Finding: Antegrade  Sys:       46.0  Dominga:       14.0    Right Subclavian: Normal    Left Carotid:-            Proximal                Mid                 Distal  cm/s  Systolic  Diastolic  Systolic  Diastolic  Systolic  Diastolic  CCA:     78.1      22.0                           103.0      27.0  Bulb:  ECA:     93.0      14.0  ICA:     91.0      24.0       76.0      26.0       59.0      24.0  ICA/CCA:  0.9       0.9    ICA Stenosis: Normal    Left Vertebral:-  Finding: Antegrade  Sys:       51.0  Dominga:       19.0    Left Subclavian: Normal    INTERPRETATION/FINDINGS  PROCEDURE:  Carotid Duplex Examination using B-mode, color and  spectral Doppler of the extracranial cerebrovascular arteries. 1. No evidence of significant arterial occlusive disease in the  internal carotid arteries. 2. No significant stenosis in the external carotid arteries  bilaterally. 3. Antegrade flow in both vertebral arteries. 4. Normal flow in both subclavian arteries. ADDITIONAL COMMENTS    I have personally reviewed the data relevant to the interpretation of  this  study. TECHNOLOGIST: Mukul Herrera. YOKO Hanks, RDMS  Signed: 2017 10:40 AM    PHYSICIAN: Brian Magaña MD  Signed: 2017 05:44 PM

## 2017-12-05 NOTE — PROGRESS NOTES
The patient is a 41year old female who was admitted for parasthesias, to rule out CVA with history of HTN and UTI. She lives alone and worked full time prior to admission. Her emergency contact is her mother Krish Greene at [phone]. Care Management Interventions  PCP Verified by CM: Yes ([facility])  Mode of Transport at Discharge: Other (see comment) (family by car)  Transition of Care Consult (CM Consult): Discharge Planning (no rehab needs)  Discharge Durable Medical Equipment: No (no DME at home)  Physical Therapy Consult: Yes  Occupational Therapy Consult: Yes  Speech Therapy Consult: No  Current Support Network: Own Home (her son lives with her, was independent, driving and working full time)  Confirm Follow Up Transport: Family (or self by car)  Discharge Location  Discharge Placement: Home   CM will follow for discharge planning.  Valentino Evan RN #[id number]

## 2017-12-06 LAB
ANA SER QL: NEGATIVE
SEE BELOW:, 164879: NORMAL

## 2017-12-06 NOTE — PROGRESS NOTES
Neurology Progress Note    Patient ID:  Sarah Penn  [id number]  41 y.o.  1970    Subjective:      Patient has no complaint of recurrent tremors that seem to respond to low-dose Ativan yesterday. She knows that most of her problems are stress related and she will be given low-dose Ativan to treat her until she sees her therapist.  Her MRI and MRA of the brain and MRA of the neck were all normal, EEG was normal and Dopplers are normal.  Patient to go home today    No current facility-administered medications for this encounter. Current Outpatient Prescriptions   Medication Sig    clonazePAM (KLONOPIN) 0.5 mg tablet Take 1 Tab by mouth two (2) times a day. Max Daily Amount: 1 mg.  butalbital-acetaminophen-caffeine (FIORICET, ESGIC) -40 mg per tablet Take 1 Tab by mouth every six (6) hours as needed for Headache. Max Daily Amount: 4 Tabs.  nitrofurantoin, macrocrystal-monohydrate, (MACROBID) 100 mg capsule Take 100 mg by mouth two (2) times a day.  Ascorbic Acid-Multivits-Min (EMERGEN-C) 1,000 mg pwep Take 1 Packet by mouth nightly as needed.  MELATONIN PO Take 3 Tabs by mouth nightly as needed (sleep aid).  ondansetron (ZOFRAN ODT) 4 mg disintegrating tablet Take 1 Tab by mouth every eight (8) hours as needed for Nausea.  atorvastatin (LIPITOR) 20 mg tablet Take 20 mg by mouth daily.         Review of Systems:    A comprehensive review of systems was negative except for: Behvioral/Psych: positive for anxiety and depression    Objective:     Patient Vitals for the past 8 hrs:   BP Temp Pulse Resp SpO2   12/05/17 1531 109/76 99 °F (37.2 °C) 79 18 98 %               Lab Review   Recent Results (from the past 24 hour(s))   CBC WITH AUTOMATED DIFF    Collection Time: 12/05/17  4:48 AM   Result Value Ref Range    WBC 8.7 3.6 - 11.0 K/uL    RBC 4.31 3.80 - 5.20 M/uL    HGB 12.9 11.5 - 16.0 g/dL    HCT 38.8 35.0 - 47.0 %    MCV 90.0 80.0 - 99.0 FL    MCH 29.9 26.0 - 34.0 PG    MCHC 33.2 30.0 - 36.5 g/dL    RDW 12.6 11.5 - 14.5 %    PLATELET 503 281 - 084 K/uL    NEUTROPHILS 62 32 - 75 %    LYMPHOCYTES 30 12 - 49 %    MONOCYTES 6 5 - 13 %    EOSINOPHILS 2 0 - 7 %    BASOPHILS 0 0 - 1 %    ABS. NEUTROPHILS 5.4 1.8 - 8.0 K/UL    ABS. LYMPHOCYTES 2.6 0.8 - 3.5 K/UL    ABS. MONOCYTES 0.5 0.0 - 1.0 K/UL    ABS. EOSINOPHILS 0.2 0.0 - 0.4 K/UL    ABS. BASOPHILS 0.0 0.0 - 0.1 K/UL   MAGNESIUM    Collection Time: 12/05/17  4:48 AM   Result Value Ref Range    Magnesium 2.0 1.6 - 2.4 mg/dL   METABOLIC PANEL, COMPREHENSIVE    Collection Time: 12/05/17  4:48 AM   Result Value Ref Range    Sodium 138 136 - 145 mmol/L    Potassium 3.8 3.5 - 5.1 mmol/L    Chloride 107 97 - 108 mmol/L    CO2 23 21 - 32 mmol/L    Anion gap 8 5 - 15 mmol/L    Glucose 85 65 - 100 mg/dL    BUN 12 6 - 20 MG/DL    Creatinine 0.89 0.55 - 1.02 MG/DL    BUN/Creatinine ratio 13 12 - 20      GFR est AA >60 >60 ml/min/1.73m2    GFR est non-AA >60 >60 ml/min/1.73m2    Calcium 8.3 (L) 8.5 - 10.1 MG/DL    Bilirubin, total 0.5 0.2 - 1.0 MG/DL    ALT (SGPT) 33 12 - 78 U/L    AST (SGOT) 24 15 - 37 U/L    Alk.  phosphatase 50 45 - 117 U/L    Protein, total 7.3 6.4 - 8.2 g/dL    Albumin 3.6 3.5 - 5.0 g/dL    Globulin 3.7 2.0 - 4.0 g/dL    A-G Ratio 1.0 (L) 1.1 - 2.2     VITAMIN B12    Collection Time: 12/05/17  4:48 AM   Result Value Ref Range    Vitamin B12 641 211 - 911 pg/mL   HOMOCYSTEINE, PLASMA    Collection Time: 12/05/17  4:48 AM   Result Value Ref Range    Homocysteine, plasma 7.9 3.7 - 13.9 umol/L   SED RATE (ESR)    Collection Time: 12/05/17  4:48 AM   Result Value Ref Range    Sed rate, automated 106 (H) 0 - 20 mm/hr   GLUCOSE, POC    Collection Time: 12/05/17  6:23 AM   Result Value Ref Range    Glucose (POC) 96 65 - 100 mg/dL    Performed by Elmer Fajardo, POC    Collection Time: 12/05/17 12:06 PM   Result Value Ref Range    Glucose (POC) 110 (H) 65 - 100 mg/dL    Performed by Jose F Oakes (PCT)        Additional comments:I reviewed the patients new imaging test results. MRI, MRA of brain, MRI of brain, MRA of neck, Dopplers, EEG        NEUROLOGICAL EXAM:    Appearance: The patient is well developed, well nourished, provides a coherent history and is in no acute distress. Mental Status: Oriented to time, place and person. Mood and affect appropriate. Cranial Nerves:   Intact visual fields. Fundi are benign. DARIO, EOM's full, no nystagmus, no ptosis. Facial sensation is normal. Corneal reflexes are intact. Facial movement is symmetric. Hearing is normal bilaterally. Palate is midline with normal sternocleidomastoid and trapezius muscles are normal. Tongue is midline. Motor:  5/5 strength in upper and lower proximal and distal muscles. Normal bulk and tone. No fasciculations. Reflexes:   Deep tendon reflexes 2+/4 and symmetrical.   Sensory:   Normal to touch, pinprick and vibration. Gait:  Normal gait. Tremor:   No tremor noted. Cerebellar:  No cerebellar signs present. Neurovascular:  Normal heart sounds and regular rhythm, peripheral pulses intact, and no carotid bruits.            Assessment:   Active Problems:    Hypertension (12/4/2017)      Hyperlipidemia (12/4/2017)      Paresthesia (12/4/2017)      Transient ischemic attack involving right internal carotid artery (12/4/2017)      Bilateral carotid artery stenosis (12/4/2017)      Nonintractable epilepsy with complex partial seizures ([address] (12/4/2017)      Altered mental status, unspecified (12/4/2017)      Left sided numbness (12/4/2017)      Cervical post-laminectomy syndrome (12/4/2017)        Plan:     All patient's test normal, patient will go home on low-dose Ativan  Talk to hospitalist and the patient and all agree to therapy  I will follow as needed while in the hospital, and follow-up in stroke clinic in 2-4 weeks      Signed:  Juana Hurt MD  12/5/2017  11:00 PM

## 2017-12-06 NOTE — PROCEDURES
[id number]   EEG       Name:  Arslan Crenshaw   MR#:  [id number]   :  1970   Account #:  [de-identified]    Date of Procedure:  2017   Date of Adm:  2017       DATE OF SERVICE: 2017. CLINICAL INDICATION: The patient is a 68-year-old female with a   history of jerks of her left arm. EEG to rule out seizures, rule out   cortical abnormality, rule out causes of altered mental status. Rule out   weakness of the arm and muscle spasms. ELECTROENCEPHALOGRAM CLASSIFICATION: This patient is   normal awake and drowsy. DESCRIPTION OF THE RECORD: The background of this recording   contains a posteriorly-located occipital alpha rhythm of 9-10 Hz that   attenuates with eye opening. Hyperventilation was not performed. Photic stimulation produced a mild driving response in the posterior   head regions. Throughout the recording, the patient did enter brief   states of drowsiness but no clear stages of sleep were identified. No   areas of focal slowing or spike or spike-and-wave discharges were   seen. No recorded spells of any type. INTERPRETATION: This is a normal electroencephalogram with the   patient awake and drowsy, showing no clear focal abnormalities, spike   discharges, or recorded spells. Clinical correlation recommended.         Griselda Peed, MD [facility]les [facility]   D:  2017   23:45   T:  2017   14:14   Job #:  [id number]

## 2017-12-08 LAB
25(OH)D2 SERPL-MCNC: <1 NG/ML
25(OH)D3 SERPL-MCNC: 27 NG/ML
25(OH)D3+25(OH)D2 SERPL-MCNC: 28 NG/ML

## 2017-12-11 ENCOUNTER — TELEPHONE (OUTPATIENT)
Dept: INTERNAL MEDICINE CLINIC | Age: 47
End: 2017-12-11

## 2017-12-11 NOTE — TELEPHONE ENCOUNTER
#[phone] pt seen in ED for possible stroke. They did put her on clonazepam for the twitching which is working and she will need to be seen soon in order to get refills on that as well. Pt states she wants to see Dr. Devon Clarke as NP and can't wait until April. Please call pt to see what you can do.

## 2017-12-11 NOTE — TELEPHONE ENCOUNTER
Called, spoke to pt. Two pt identifiers confirmed. Pt asking if there is any way possible to be seen by Dr. Miryam Arango. Pt recently in ED for possible stroke. Pt referred to Dr. Miryam Arango by someone and wants to see her. Pt would like anything asap for she has been experiencing twitching and the clonazepam helps though she is about to run out. Pt informed Dr. Miryam Arango cannot prescribe anything prior to being established and that the medication is a controlled substance. Pt advised to take next available. Pt scheduled for 3/2/18 0930. Pt states she would like to be seen sooner for she will not be able to work if twitching comes back and cannot wait until march. Pt informed Dr. Miryam Arango will be notified. Pt verbalized understanding of information discussed w/ no further questions at this time.

## 2017-12-12 NOTE — TELEPHONE ENCOUNTER
MD Monserrat Fonseca LPN        Caller: Unspecified (Yesterday,  8:59 AM)                     Will keep her on a wt list and call her if spot opens

## 2017-12-13 ENCOUNTER — OFFICE VISIT (OUTPATIENT)
Dept: INTERNAL MEDICINE CLINIC | Age: 47
End: 2017-12-13

## 2017-12-13 VITALS
HEART RATE: 67 BPM | BODY MASS INDEX: 28.28 KG/M2 | RESPIRATION RATE: 16 BRPM | SYSTOLIC BLOOD PRESSURE: 119 MMHG | TEMPERATURE: 97.9 F | HEIGHT: 66 IN | OXYGEN SATURATION: 100 % | WEIGHT: 176 LBS | DIASTOLIC BLOOD PRESSURE: 80 MMHG

## 2017-12-13 DIAGNOSIS — R70.0 ESR RAISED: ICD-10-CM

## 2017-12-13 DIAGNOSIS — K59.04 CHRONIC IDIOPATHIC CONSTIPATION: ICD-10-CM

## 2017-12-13 DIAGNOSIS — N30.00 ACUTE CYSTITIS WITHOUT HEMATURIA: ICD-10-CM

## 2017-12-13 DIAGNOSIS — F41.9 ANXIETY: ICD-10-CM

## 2017-12-13 DIAGNOSIS — I10 ESSENTIAL HYPERTENSION: Primary | ICD-10-CM

## 2017-12-13 DIAGNOSIS — G44.229 CHRONIC TENSION-TYPE HEADACHE, NOT INTRACTABLE: ICD-10-CM

## 2017-12-13 DIAGNOSIS — R35.0 URINARY FREQUENCY: ICD-10-CM

## 2017-12-13 DIAGNOSIS — F32.9 REACTIVE DEPRESSION: ICD-10-CM

## 2017-12-13 DIAGNOSIS — E78.00 PURE HYPERCHOLESTEROLEMIA: ICD-10-CM

## 2017-12-13 LAB
BILIRUB UR QL STRIP: NEGATIVE
GLUCOSE UR-MCNC: NORMAL MG/DL
KETONES P FAST UR STRIP-MCNC: NEGATIVE MG/DL
PH UR STRIP: 5 [PH] (ref 4.6–8)
PROT UR QL STRIP: NORMAL
SP GR UR STRIP: 1.01 (ref 1–1.03)
UA UROBILINOGEN AMB POC: NORMAL (ref 0.2–1)
URINALYSIS CLARITY POC: CLEAR
URINALYSIS COLOR POC: YELLOW
URINE BLOOD POC: NEGATIVE
URINE LEUKOCYTES POC: NORMAL
URINE NITRITES POC: POSITIVE

## 2017-12-13 RX ORDER — CLONAZEPAM 0.5 MG/1
0.5 TABLET ORAL 2 TIMES DAILY
Qty: 40 TAB | Refills: 0 | Status: SHIPPED | OUTPATIENT
Start: 2017-12-13 | End: 2018-01-23 | Stop reason: SDUPTHER

## 2017-12-13 RX ORDER — LISINOPRIL AND HYDROCHLOROTHIAZIDE 12.5; 2 MG/1; MG/1
TABLET ORAL DAILY
COMMUNITY
End: 2018-05-04 | Stop reason: SDUPTHER

## 2017-12-13 RX ORDER — ESCITALOPRAM OXALATE 10 MG/1
10 TABLET ORAL DAILY
Qty: 30 TAB | Refills: 1 | Status: SHIPPED | OUTPATIENT
Start: 2017-12-13 | End: 2018-01-23 | Stop reason: DRUGHIGH

## 2017-12-13 RX ORDER — CIPROFLOXACIN 250 MG/1
250 TABLET, FILM COATED ORAL 2 TIMES DAILY
Qty: 6 TAB | Refills: 0 | Status: SHIPPED | OUTPATIENT
Start: 2017-12-13 | End: 2017-12-16

## 2017-12-13 RX ORDER — ATORVASTATIN CALCIUM 20 MG/1
TABLET, FILM COATED ORAL DAILY
COMMUNITY
End: 2018-05-04 | Stop reason: SDUPTHER

## 2017-12-13 NOTE — PROGRESS NOTES
HISTORY OF PRESENT ILLNESS  Sarah Penn is a 41 y.o. female. HPI   Pt is here to establish/transitions of care. Pt presents with her pill bottles. Ms. Juancarlos Best is a 41y.o. year old female, she is seen today for Transition of Care services following a hospital discharge for paresthesias and L-sided twitching 12/4/17-12/5/17. Our office Nurse Navigator did not perform an outreach to Ms. Baylee Esparza, but will (within 2 business days of discharge) to complete medication reconciliation and a telephonic assessment of her condition. BP is 119/80  Pt used to take lisinopril-HCTZ daily  Her BP medications were stopped in the hospital  However had high bp prior to admit, she restarted med this morning  Feels bettet on it, no orthostasis  Will have her continue lisinopril-HCTZ daily     Wt is 176 lbs today  Discussed diet and weight loss     Pt was admitted to the hospital for paresthesias and L-sided twitching 12/4/17-12/5/17  Pt states that she had uncontrolled twitching on her L side and some tingling on her face    Reviewed labs 12/17: elevated SED rate, mildly low vit D; kidney, liver, blood counts nl, autoimmune and syphilis negative  Will have her start taking vit D OTC 1000U daily  Ordered labs to complete prior to next visit     Reviewed MRI brain 12/4/17: 1. Normal pre- and postcontrast brain MRI. No acute infarct. 2. Normal MR angiography of the head. Reviewed MRA brain 12/4/17: 1. Normal pre- and postcontrast brain MRI. No acute infarct. 2. Normal MR angiography of the head. Reviewed ECHO 12/4/17: nl  Reviewed EKG 12/4/17: nsr    Reviewed notes from Dr. Yari Liang (neuro) 12/4/17: Patient with unusual symptoms of left-sided numbness and left-sided muscle spasms and jerks, could be a focal seizure but it does not sound like that, will need MRI scan of the brain to rule out tumor, rule out stroke, without causes of her symptoms.   Also get an EEG and carotid Doppler study to rule out other causes of her symptoms. His echocardiogram and cardiac monitoring Continue antiplatelets and statin for now, further treatment evaluation pending the results of her clinical course and testing results and response to therapy. Her exam objectively is not too remarkable though she still complains of subjective weakness and numbness on the left side. Pt has an appt to see a neuro in 1/18    Reviewed discharge summary 12/5/17: Barney Ferrara a 41 y.o.   female  with PMHx significant for HTN, presents ambulatory to the ED with cc of intermittent left sided facial numbness, LUE weakness and tremors to the left upper and lower extremities since 12/2/2017. Pt also reports that she feels a heavy sensation to her chest. She states that her sx's worsen when she lays flat and improves when she stands up. She reports taking Aleve and Advil PM but denies any relief these medications. She reports that she was seen by her OBGYN on 12/1/2017. She states she was diagnosed with a UTI and prescribed Macrobid BID. She reports experiencing tremors in the past, noting her PCP believed that this was related to an adverse reaction to Paxil at that time. As a result she notes concern about whether her sx's today could be related to her taking Macrobid. However she states she's taken this medication in the past with no complications. She specifically denies any fevers, chills, nausea, vomiting, shortness of breath, headache, rash, diarrhea, sweating or weight loss. At this time patient is lying in bed c/o tremors, left face numbness, dizziness, weakness, denies chest pain, no SOB, no fever, no cough, no N/V no diarrhea, no other associated symptoms. We were asked to admit for work up and evaluation of the above problems.      Pt is no longer taking lipitor 20mg daily since hospital stay  Was not sure if she had se or not but thinks she scarlet well   Advised her to start this medication    Continues on fioricet for HAs - works well  Will have neuro monitor her HAs    Pt saw Dr. Pato Whiting (gyn) for a UTI  Pt just completed a course of macrobid  Ordered cipro BID for 3 days  Will send for culture    Pt c/o anxiety and depression  Pt states that she has anxiety attacks every so often  Pt was given a list of psychs to establish with in the near future  Advised her to establish with Dr. Enoc Lara (psych)   Pt is now taking klonopin 0.5mg BID x hospitalization  Advised her to take this BID for 2 weeks and then, once daily   Will start her on lexapro 10mg daily  Discussed it taking 4 weeks for it to start working and 6 weeks for the full effect  If her sx worsen, will try effexor  Recall pt tried and failed paxil    Pt c/o night sweats  Advised her to stay off hormones at this time    Pt c/o constipation  Advised her to use miralax prn     Pt c/o dizziness when standing up too quickly  Overall, her sx are stable     PMHx:  HTN    FMHx:  Father - Alzheimer's dementia, HTN  Mother - stroke, 2 MIs, COPD (smoker)    PSHx:  Cholecystectomy  Urethral repair   Herniated disc    SHx:  Occasional alcohol  Never smoker   with 3 sons    PREVENTIVE:  Colonoscopy: not yet needed, [facility]  Pap: Dr. Paula Penningtonnt, 2016, due, scheduled for 1/18  Mammogram: ~2015, scheduled for 1/18  Dexa: not yet needed  Tdap: 2013  Pneumovax: not yet needed  Zostavax: not yet needed  Flu shot: Fall 2017  Eye exam: 2017 at [facility]  A1C: 12/17 5.6  Lipids: 12/17 .4    Patient Active Problem List    Diagnosis Date Noted    Hypertension 12/04/2017    Hyperlipidemia 12/04/2017    Paresthesia 12/04/2017    Transient ischemic attack involving right internal carotid artery 12/04/2017    Bilateral carotid artery stenosis 12/04/2017    Nonintractable epilepsy with complex partial seizures ([facility] [address] 12/04/2017    Altered mental status, unspecified 12/04/2017    Left sided numbness 12/04/2017    Cervical post-laminectomy syndrome 12/04/2017     Current Outpatient Prescriptions Medication Sig Dispense Refill    clonazePAM (KLONOPIN) 0.5 mg tablet Take 1 Tab by mouth two (2) times a day. Max Daily Amount: 1 mg. 20 Tab 0    butalbital-acetaminophen-caffeine (FIORICET, ESGIC) -40 mg per tablet Take 1 Tab by mouth every six (6) hours as needed for Headache. Max Daily Amount: 4 Tabs. 20 Tab 0    Ascorbic Acid-Multivits-Min (EMERGEN-C) 1,000 mg pwep Take 1 Packet by mouth nightly as needed.  MELATONIN PO Take 3 Tabs by mouth nightly as needed (sleep aid).  atorvastatin (LIPITOR) 20 mg tablet Take 20 mg by mouth daily.  ondansetron (ZOFRAN ODT) 4 mg disintegrating tablet Take 1 Tab by mouth every eight (8) hours as needed for Nausea. 10 Tab 0     Past Surgical History:   Procedure Laterality Date    HX CHOLECYSTECTOMY      HX ORTHOPAEDIC      herniated disc     HX UROLOGICAL      URETHRAL REPAIR      Lab Results  Component Value Date/Time   WBC 8.7 12/05/2017 04:48 AM   HGB 12.9 12/05/2017 04:48 AM   HCT 38.8 12/05/2017 04:48 AM   PLATELET 217 41/15/6889 04:48 AM   MCV 90.0 12/05/2017 04:48 AM     Lab Results  Component Value Date/Time   Cholesterol, total 268 12/04/2017 01:31 PM   HDL Cholesterol 48 12/04/2017 01:31 PM   LDL, calculated 169.4 12/04/2017 01:31 PM   Triglyceride 253 12/04/2017 01:31 PM   CHOL/HDL Ratio 5.6 12/04/2017 01:31 PM     Lab Results  Component Value Date/Time   GFR est non-AA >60 12/05/2017 04:48 AM   GFR est AA >60 12/05/2017 04:48 AM   Creatinine 0.89 12/05/2017 04:48 AM   BUN 12 12/05/2017 04:48 AM   Sodium 138 12/05/2017 04:48 AM   Potassium 3.8 12/05/2017 04:48 AM   Chloride 107 12/05/2017 04:48 AM   CO2 23 12/05/2017 04:48 AM   Magnesium 2.0 12/05/2017 04:48 AM          Review of Systems   Constitutional: Negative for chills and fever. HENT: Negative for hearing loss and tinnitus. Eyes: Positive for blurred vision and double vision. Respiratory: Negative for shortness of breath and wheezing.     Cardiovascular: Negative for chest pain and palpitations. Gastrointestinal: Positive for constipation and nausea. Negative for vomiting. Genitourinary: Negative for dysuria and frequency. Musculoskeletal: Negative for back pain and falls. Skin: Negative for itching and rash. Neurological: Positive for dizziness and headaches. Negative for loss of consciousness. Psychiatric/Behavioral: Positive for depression. The patient is nervous/anxious. Physical Exam   Constitutional: She is oriented to person, place, and time. She appears well-developed and well-nourished. No distress. HENT:   Head: Normocephalic and atraumatic. Right Ear: External ear normal.   Left Ear: External ear normal.   Mouth/Throat: Oropharynx is clear and moist. No oropharyngeal exudate. Eyes: Conjunctivae and EOM are normal. Pupils are equal, round, and reactive to light. Right eye exhibits no discharge. Left eye exhibits no discharge. No scleral icterus. Neck: Normal range of motion. Neck supple. No carotid bruits    Cardiovascular: Normal rate, regular rhythm, normal heart sounds and intact distal pulses. Exam reveals no gallop and no friction rub. No murmur heard. Pulmonary/Chest: Effort normal and breath sounds normal. No respiratory distress. She has no wheezes. She has no rales. She exhibits no tenderness. Abdominal: Soft. She exhibits no distension and no mass. There is no tenderness. There is no rebound and no guarding. Musculoskeletal: Normal range of motion. She exhibits no edema, tenderness or deformity. Lymphadenopathy:     She has no cervical adenopathy. Neurological: She is alert and oriented to person, place, and time. Coordination normal.   Skin: Skin is warm and dry. No rash noted. She is not diaphoretic. No erythema. No pallor. Psychiatric: She has a normal mood and affect. Her behavior is normal.       ASSESSMENT and PLAN    ICD-10-CM ICD-9-CM    1.  Essential hypertension    BP controled on lisinopril-HCTZ, will continue this daily, will not decrease dose, it was transiently held during her hospitalization, she is no longer orthostatic and reports sx with elevated BP   I10 401.9 REFERRAL TO OPHTHALMOLOGY      LIPID PANEL      METABOLIC PANEL, COMPREHENSIVE      SED RATE (ESR)   2. Pure hypercholesterolemia    Restart lipitor   Check lipids prior to f/u    E78.00 272.0 LIPID PANEL      METABOLIC PANEL, COMPREHENSIVE      SED RATE (ESR)   3. Anxiety    Pt is currently taking klonopin BID since her hospitalization, her anxiety has caused her to have L sided arm twitching, she was on paxil many years ago which she had SE with, discussed tx options, will start lexapro 10mg daily, discussed if this does not work or if she has SE will use effexor instead, she will contact office if there is an issue    Will have her continue klonopin BID for 2 weeks then decrease to just qhs, she is amenable to seeing psych and was planning on doing so, discussed scheduling an appt with Dr. Shaym Mena   F41.9 300.00 LIPID PANEL      METABOLIC PANEL, COMPREHENSIVE      SED RATE (ESR)   4. Acute cystitis without hematuria    Will treat with cipro BID for 3 days, send for culture   N30.00 595.0 LIPID PANEL      METABOLIC PANEL, COMPREHENSIVE      SED RATE (ESR)   5. Chronic tension-type headache, not intractable    Follows with neuro for this and has an appt pending, will not make any further med adjustments today    G44.229 339.12 LIPID PANEL      METABOLIC PANEL, COMPREHENSIVE      SED RATE (ESR)   6. Reactive depression    Starting lexapro    F32.9 300.4 LIPID PANEL      METABOLIC PANEL, COMPREHENSIVE      SED RATE (ESR)   7. ESR raised    Pt had a substantially elevated SED rate during her hospital stay, unclear why, will repeat this prior to f/u and ensure that is has returned to nl   R70.0 790.1 LIPID PANEL      METABOLIC PANEL, COMPREHENSIVE      SED RATE (ESR)   8.  Chronic idiopathic constipation    Discussed miralax daily OTC   K59.04 564.00 9. Urinary frequency    See above   R35.0 788.41         Scribed by Donte Brandon, as dictated by Dr. Grupo Strange. Current diagnosis and concerns discussed with pt at length. Pt understands risks and benefits or current treatment plan and medications, and accepts the treatment and medication with any possible risks. Pt asks appropriate questions, which were answered. Pt was instructed to call with any concerns or problems. This note will not be viewable in [address]

## 2017-12-13 NOTE — PATIENT INSTRUCTIONS
Labs 1 week prior to follow up     miralax for constipation     lexapro daily for anxiety     Reduce klonopin to once daily after 2 weeks     Continue lisinopril-hct

## 2017-12-13 NOTE — TELEPHONE ENCOUNTER
Called, spoke to pt. Two pt identifiers confirmed. Pt informed per Dr. Xenia Cavazos if pt can come in now she will see her. Pt states she will be here w/in the next 30 minutes. Pt verbalized understanding of information discussed w/ no further questions at this time.

## 2017-12-13 NOTE — MR AVS SNAPSHOT
Visit Information Date & Time Provider Department Dept. Phone Encounter #  
 12/13/2017  9:15 AM Sinai Shah, [address]  [id number] Follow-up Instructions Return in about 6 weeks (around 1/24/2018). Your Appointments 12/26/2017 11:15 AM  
New Patient with Mya Dewitt MD  
[facility] [address] [facility]) Appt Note: np est pcp  had a reaction to a medication sc 12/05/17  
 [address] [facility] [address]  
  
    
 1/16/2018 10:00 AM  
Follow Up with Zelalem Holman NP [facility] [address] [facility]) Appt Note: Hospital follow up left side numbness and weakness. ..tlw 12/5/17  
 [address] [facility], 
[address] [place] [address], [address] [phone]  
[address] [facility], [address] [place] [address], [address] [phone]  
  
    
 3/2/2018  9:30 AM  
New Patient with Sinai Shah MD  
[facility] [address] [facility]) Appt Note: new pt; est care [address] [facility] [address] [address]  
[address] [facility] [address] [facility] Upcoming Health Maintenance Date Due DTaP/Tdap/Td series (1 - Tdap) 9/1/1993 PAP AKA CERVICAL CYTOLOGY 9/1/1993 Allergies as of 12/13/2017  Review Complete On: 12/13/2017 By: Sinai Shah MD  
  
 Severity Noted Reaction Type Reactions Norvasc [Amlodipine]  02/10/2011   Side Effect Swelling Swelling to feet and ankles Paxil [Paroxetine Hcl]  12/04/2017    Other (comments) Serotonin SD Current Immunizations  Never Reviewed Name Date Influenza Vaccine 12/13/2017 Tdap 1/1/2013 Not reviewed this visit You Were Diagnosed With   
  
 Codes Comments Essential hypertension    -  Primary ICD-10-CM: I10 
ICD-9-CM: 401.9 Pure hypercholesterolemia     ICD-10-CM: E78.00 ICD-9-CM: 272.0 Anxiety     ICD-10-CM: F41.9 ICD-9-CM: 300.00 Acute cystitis without hematuria     ICD-10-CM: N30.00 ICD-9-CM: 595.0 Chronic tension-type headache, not intractable     ICD-10-CM: F13.294 ICD-9-CM: 339.12 Reactive depression     ICD-10-CM: F32.9 ICD-9-CM: 300.4 ESR raised     ICD-10-CM: R70.0 ICD-9-CM: 790.1 Chronic idiopathic constipation     ICD-10-CM: K59.04 
ICD-9-CM: 564.00 Urinary frequency     ICD-10-CM: R35.0 ICD-9-CM: 788.41 Vitals BP Pulse Temp Resp Height(growth percentile) Weight(growth percentile) 119/80 (BP 1 Location: Left arm, BP Patient Position: Sitting) 67 97.9 °F (36.6 °C) (Oral) 16 5' 6\" (1.676 m) 176 lb (79.8 kg) SpO2 BMI OB Status Smoking Status 100% 28.41 kg/m2 IUD Never Smoker Vitals History BMI and BSA Data Body Mass Index Body Surface Area  
 28.41 kg/m 2 1.93 m 2 Preferred Pharmacy Pharmacy Name Phone [id number] [facility] [phone] Your Updated Medication List  
  
   
This list is accurate as of: 12/13/17 10:12 AM.  Always use your most recent med list.  
  
  
  
  
 butalbital-acetaminophen-caffeine -40 mg per tablet Commonly known as:  Lavella Amble Take 1 Tab by mouth every six (6) hours as needed for Headache. Max Daily Amount: 4 Tabs. ciprofloxacin HCl 250 mg tablet Commonly known as:  CIPRO Take 1 Tab by mouth two (2) times a day for 3 days. clonazePAM 0.5 mg tablet Commonly known as:  Yuliet Miner Take 1 Tab by mouth two (2) times a day. Max Daily Amount: 1 mg. EMERGEN-C 1,000 mg Pwep Generic drug:  Ascorbic Acid-Multivits-Min Take 1 Packet by mouth nightly as needed. escitalopram oxalate 10 mg tablet Commonly known as:  Gio  Take 1 Tab by mouth daily. LIPITOR 20 mg tablet Generic drug:  atorvastatin Take  by mouth daily. lisinopril-hydroCHLOROthiazide 20-12.5 mg per tablet Commonly known as:  Savanna Mimi Take  by mouth daily. MELATONIN PO Take 3 Tabs by mouth nightly as needed (sleep aid). Prescriptions Printed Refills  
 clonazePAM (KLONOPIN) 0.5 mg tablet 0 Sig: Take 1 Tab by mouth two (2) times a day. Max Daily Amount: 1 mg. Class: Print Route: Oral  
  
Prescriptions Sent to Pharmacy Refills  
 escitalopram oxalate (LEXAPRO) 10 mg tablet 1 Sig: Take 1 Tab by mouth daily. Class: Normal  
 Pharmacy: [address] Ph #: [phone] Route: Oral  
 ciprofloxacin HCl (CIPRO) 250 mg tablet 0 Sig: Take 1 Tab by mouth two (2) times a day for 3 days. Class: Normal  
 Pharmacy: [address] Ph #: [phone] Route: Oral  
  
We Performed the Following REFERRAL TO OPHTHALMOLOGY [REF57 Custom] Follow-up Instructions Return in about 6 weeks (around 1/24/2018). To-Do List   
 12/20/2017 Lab:  LIPID PANEL   
  
 12/20/2017 Lab:  METABOLIC PANEL, COMPREHENSIVE   
  
 12/20/2017 Lab:  SED RATE (ESR) Referral Information Referral ID Referred By Referred To  
  
 [id number] [facility] [address], [address] [place] [address] Way Phone: [phone] Fax: [phone] Visits Status Start Date End Date 1 Open 12/13/17 12/13/18 If your referral has a status of pending review or denied, additional information will be sent to support the outcome of this decision. Patient Instructions Labs 1 week prior to follow up  
 
miralax for constipation  
 
lexapro daily for anxiety Reduce klonopin to once daily after 2 weeks Continue lisinopril-hct Introducing hospitals & HEALTH SERVICES! [employer] introduces Frequent Browser patient portal. Now you can access parts of your medical record, email your doctor's office, and request medication refills online. 1. In your internet browser, go to [email or web address] 2. Click on the First Time User? Click Here link in the Sign In box. You will see the New Member Sign Up page. 3. Enter your Frequent Browser Access Code exactly as it appears below. You will not need to use this code after youve completed the sign-up process. If you do not sign up before the expiration date, you must request a new code. · Frequent Browser Access Code: [email or web address] Expires: 3/5/2018  5:13 PM 
 
4. Enter the last four digits of your Social Security Number (xxxx) and Date of Birth (mm/dd/yyyy) as indicated and click Submit. You will be taken to the next sign-up page. 5. Create a Frequent Browser ID. This will be your Frequent Browser login ID and cannot be changed, so think of one that is secure and easy to remember. 6. Create a Frequent Browser password. You can change your password at any time. 7. Enter your Password Reset Question and Answer. This can be used at a later time if you forget your password. 8. Enter your e-mail address. You will receive e-mail notification when new information is available in [address] 9. Click Sign Up. You can now view and download portions of your medical record. 10. Click the Download Summary menu link to download a portable copy of your medical information. If you have questions, please visit the Frequently Asked Questions section of the Frequent Browser website. Remember, Frequent Browser is NOT to be used for urgent needs. For medical emergencies, dial 911. Now available from your iPhone and Android! Please provide this summary of care documentation to your next provider. Your primary care clinician is listed as Joaquin Allison.  If you have any questions after today's visit, please call [phone].

## 2017-12-15 LAB — BACTERIA UR CULT: ABNORMAL

## 2017-12-18 ENCOUNTER — TELEPHONE (OUTPATIENT)
Dept: INTERNAL MEDICINE CLINIC | Age: 47
End: 2017-12-18

## 2017-12-18 RX ORDER — NITROFURANTOIN 25; 75 MG/1; MG/1
100 CAPSULE ORAL 2 TIMES DAILY
Qty: 10 CAP | Refills: 0 | Status: SHIPPED | OUTPATIENT
Start: 2017-12-18 | End: 2017-12-18 | Stop reason: ALTCHOICE

## 2017-12-18 RX ORDER — SULFAMETHOXAZOLE AND TRIMETHOPRIM 800; 160 MG/1; MG/1
1 TABLET ORAL 2 TIMES DAILY
Qty: 10 TAB | Refills: 0 | Status: SHIPPED | OUTPATIENT
Start: 2017-12-18 | End: 2017-12-23

## 2017-12-18 NOTE — TELEPHONE ENCOUNTER
Called, spoke to pt. Two pt identifiers confirmed. Pt informed Macrobid ordered. Pt states that she previously took macrobid for last UTI, pt states that she was given Macrobid at the Hospital and UTI came back previous to [address] [place] [address] w/ Dr. Victoria Menjivar. Pt wondering if she should try something else. Pt informed Dr. Victoria Menjivar will be notified. Pt verbalized understanding of information discussed w/ no further questions at this time.

## 2017-12-18 NOTE — PROGRESS NOTES
Called, spoke to pt. Two pt identifiers confirmed. Pt informed per Dr. Jung Ryan UC resistant to cipro-Will change abx to macrobid 100mg bid for 5 days. Pt verbalized understanding of information discussed w/ no further questions at this time.

## 2017-12-18 NOTE — TELEPHONE ENCOUNTER
Called, spoke to pt. Two pt identifiers confirmed. Pt informed per Dr. Devon Clarke bactrim ordered instead of macrobid-advised of possible sensitivity from culture. Pt advised to callback if sx persist.  Pt verbalized understanding of information discussed w/ no further questions at this time.

## 2018-01-23 ENCOUNTER — OFFICE VISIT (OUTPATIENT)
Dept: INTERNAL MEDICINE CLINIC | Age: 48
End: 2018-01-23

## 2018-01-23 VITALS
WEIGHT: 174 LBS | TEMPERATURE: 98.3 F | HEART RATE: 86 BPM | BODY MASS INDEX: 27.97 KG/M2 | SYSTOLIC BLOOD PRESSURE: 115 MMHG | DIASTOLIC BLOOD PRESSURE: 77 MMHG | OXYGEN SATURATION: 95 % | HEIGHT: 66 IN | RESPIRATION RATE: 16 BRPM

## 2018-01-23 DIAGNOSIS — N30.00 ACUTE CYSTITIS WITHOUT HEMATURIA: ICD-10-CM

## 2018-01-23 DIAGNOSIS — Z12.39 BREAST SCREENING: ICD-10-CM

## 2018-01-23 DIAGNOSIS — I10 ESSENTIAL HYPERTENSION: ICD-10-CM

## 2018-01-23 DIAGNOSIS — F32.9 REACTIVE DEPRESSION: ICD-10-CM

## 2018-01-23 DIAGNOSIS — G44.229 CHRONIC TENSION-TYPE HEADACHE, NOT INTRACTABLE: ICD-10-CM

## 2018-01-23 DIAGNOSIS — F41.9 ANXIETY: ICD-10-CM

## 2018-01-23 DIAGNOSIS — R70.0 ESR RAISED: ICD-10-CM

## 2018-01-23 DIAGNOSIS — I10 ESSENTIAL HYPERTENSION: Primary | ICD-10-CM

## 2018-01-23 DIAGNOSIS — E78.00 PURE HYPERCHOLESTEROLEMIA: ICD-10-CM

## 2018-01-23 RX ORDER — ESCITALOPRAM OXALATE 20 MG/1
20 TABLET ORAL DAILY
Qty: 30 TAB | Refills: 3 | Status: SHIPPED | OUTPATIENT
Start: 2018-01-23 | End: 2018-05-04 | Stop reason: DRUGHIGH

## 2018-01-23 RX ORDER — CLONAZEPAM 0.5 MG/1
0.5 TABLET ORAL
Qty: 30 TAB | Refills: 3 | Status: SHIPPED | OUTPATIENT
Start: 2018-01-23 | End: 2018-04-23 | Stop reason: SDUPTHER

## 2018-01-23 NOTE — PROGRESS NOTES
HISTORY OF PRESENT ILLNESS  Liz Vang is a 41 y.o. female. HPI   Last here 12/13/17. Pt is here for routine care. Pt presents with her pill bottles.     BP is 115/77  Lov, I started her on lisinopril-HCTZ daily - reports compliance   No monitoring her bp at home     Wt is down 2 lbs since last visit  Discussed diet and weight loss     Reviewed labs 12/17  Will get labs today     Lov, pt was admitted to the hospital for paresthesias and L-sided twitching 12/4/17-12/5/17  This has improved    Pt saw Dr. Rita Laguna (neuro) in the hospital  Last visit was 12/4/17  Pt did not go to her 1/16/18 appt d/t costs  Pt will only follow up with this physician prn   He follows ha as well     Pt saw Dr. Damian Corona (gyn) for a UTI  Lov, her urine culture was resistant to cipro  I provided her with macrobid instead  Pt has been taking 2 tabs cranberry tabs daily  Her sx have since resolved    Lov, pt c/o anxiety and depression  Lov, I started her on lexapro 10mg daily, which worked well at first  Pt has been using klonopin BID for 1 month, which allowed her to sleep well  Pt states that she has run out of klonopin  Discussed not being taking klonopin BID  Ordered klonopin    Will increase lexapro to 20mg daily  Today, pt states that she has been having panic attacks  Pt cannot catch her breath and sleep well at night  Pt lost her job and had to file for bankruptcy , her fiance left  Her insurance will be ending in the near future  Her father has not been doing well has dementia and mom has COPD  Her son got in to trouble and is facing felony charges, she is main provider for her household and cares  For her 3 kids.       She tried to call Dr. Abdirahman Kelsey (psych), but this psych was not accepting new pts  She has been going to [facility]   Provided referral for [facility] and a   Recall pt tried and failed paxil    Lov, I advised her to restart her lipitor 20mg daily  Pt is now taking lipitor 20mg daily    Pt is now taking vit D 1000U OTC daily     Continues on fioricet for HAs, which works well  Pt has not had any HAs recently       PREVENTIVE:  Colonoscopy: not yet needed, denies FMHX  Pap: Dr. Cirilo Weldon, 2016, due, scheduled for 3/18, ordered to complete before insurance runs out  Mammogram: ~2015, scheduled for 3/18, ordered to complete before insurance runs out  Dexa: not yet needed  Tdap: 2013  Pneumovax: not yet needed  Zostavax: not yet needed  Flu shot: Fall 2017  Eye exam: 2017 at [facility]  A1C: 12/17 5.6  Lipids: 12/17 .4    Patient Active Problem List    Diagnosis Date Noted    Hypertension 12/04/2017    Hyperlipidemia 12/04/2017    Paresthesia 12/04/2017    Transient ischemic attack involving right internal carotid artery 12/04/2017    Bilateral carotid artery stenosis 12/04/2017    Nonintractable epilepsy with complex partial seizures ([facility] [address] 12/04/2017    Altered mental status, unspecified 12/04/2017    Left sided numbness 12/04/2017    Cervical post-laminectomy syndrome 12/04/2017     Current Outpatient Prescriptions   Medication Sig Dispense Refill    escitalopram oxalate (LEXAPRO) 20 mg tablet Take 1 Tab by mouth daily. 30 Tab 3    clonazePAM (KLONOPIN) 0.5 mg tablet Take 1 Tab by mouth nightly as needed. Max Daily Amount: 0.5 mg. 30 Tab 3    lisinopril-hydroCHLOROthiazide (PRINZIDE, ZESTORETIC) 20-12.5 mg per tablet Take  by mouth daily.  atorvastatin (LIPITOR) 20 mg tablet Take  by mouth daily.  butalbital-acetaminophen-caffeine (FIORICET, ESGIC) -40 mg per tablet Take 1 Tab by mouth every six (6) hours as needed for Headache. Max Daily Amount: 4 Tabs. 20 Tab 0    Ascorbic Acid-Multivits-Min (EMERGEN-C) 1,000 mg pwep Take 1 Packet by mouth nightly as needed.  MELATONIN PO Take 3 Tabs by mouth nightly as needed (sleep aid).        Past Surgical History:   Procedure Laterality Date    HX CHOLECYSTECTOMY      HX ORTHOPAEDIC      herniated disc     HX UROLOGICAL      URETHRAL REPAIR      Lab Results  Component Value Date/Time   WBC 8.7 12/05/2017 04:48 AM   HGB 12.9 12/05/2017 04:48 AM   HCT 38.8 12/05/2017 04:48 AM   PLATELET 251 12/52/8762 04:48 AM   MCV 90.0 12/05/2017 04:48 AM     Lab Results  Component Value Date/Time   Cholesterol, total 268 12/04/2017 01:31 PM   HDL Cholesterol 48 12/04/2017 01:31 PM   LDL, calculated 169.4 12/04/2017 01:31 PM   Triglyceride 253 12/04/2017 01:31 PM   CHOL/HDL Ratio 5.6 12/04/2017 01:31 PM     Lab Results  Component Value Date/Time   GFR est non-AA >60 12/05/2017 04:48 AM   GFR est AA >60 12/05/2017 04:48 AM   Creatinine 0.89 12/05/2017 04:48 AM   BUN 12 12/05/2017 04:48 AM   Sodium 138 12/05/2017 04:48 AM   Potassium 3.8 12/05/2017 04:48 AM   Chloride 107 12/05/2017 04:48 AM   CO2 23 12/05/2017 04:48 AM   Magnesium 2.0 12/05/2017 04:48 AM        Review of Systems   Respiratory: Negative for shortness of breath. Cardiovascular: Negative for chest pain. Psychiatric/Behavioral: Positive for depression. The patient is nervous/anxious and has insomnia. Physical Exam   Constitutional: She is oriented to person, place, and time. She appears well-developed and well-nourished. No distress. HENT:   Head: Normocephalic and atraumatic. Mouth/Throat: Oropharynx is clear and moist. No oropharyngeal exudate. Eyes: Conjunctivae and EOM are normal. Right eye exhibits no discharge. Left eye exhibits no discharge. Neck: Normal range of motion. Neck supple. Cardiovascular: Normal rate, regular rhythm, normal heart sounds and intact distal pulses. Exam reveals no gallop and no friction rub. No murmur heard. Pulmonary/Chest: Effort normal and breath sounds normal. No respiratory distress. She has no wheezes. She has no rales. She exhibits no tenderness. Musculoskeletal: Normal range of motion. She exhibits no edema, tenderness or deformity. Lymphadenopathy:     She has no cervical adenopathy.    Neurological: She is alert and oriented to person, place, and time. Coordination normal.   Skin: Skin is warm and dry. No rash noted. She is not diaphoretic. No erythema. No pallor. Psychiatric: She has a normal mood and affect. Her behavior is normal.       ASSESSMENT and PLAN    ICD-10-CM ICD-9-CM    1. Essential hypertension    Controled on lisinopril-HCTZ, continue, no change to dose    I10 401.9    2. Anxiety    Increase lexapro to 20mg, will give klonopin to use qhs only, not to be taken more than once per day, provided information for psych, she needs to establish with a psych as she has stressors at home, will also place a social work referral as she has lost her insurance, lost her job and does not know how to proceed next   F41.9 300.00 REFERRAL TO PSYCHIATRY      clonazePAM (KLONOPIN) 0.5 mg tablet   3. Reactive depression    See above   F32.9 300.4 REFERRAL TO PSYCHIATRY   4. Pure hypercholesterolemia    Now back on lipitor, check lipids and adjust dose further prn   E78.00 272.0    5. Breast screening    Screen   Z12.31 V76.10 [facility] MAMMO BI SCREENING INCL CAD        Scribed by [address], as dictated by Dr. Sujatha Martin. Current diagnosis and concerns discussed with pt at length. Pt understands risks and benefits or current treatment plan and medications, and accepts the treatment and medication with any possible risks. Pt asks appropriate questions, which were answered. Pt was instructed to call with any concerns or problems. This note will not be viewable in [address]

## 2018-01-23 NOTE — MR AVS SNAPSHOT
[address] [facility] 
[phone] Patient: Dagoberto Lyn MRN: [id number] DSW:[id number] Visit Information Date & Time Provider Department Dept. Phone Encounter #  
 1/23/2018 10:15 AM Joey Pineda, [address] [phone] Follow-up Instructions Return in about 3 months (around 4/23/2018). Your Appointments 3/2/2018  9:30 AM  
New Patient with Jeoy Pineda MD  
[facility] [address] [facility]) Appt Note: new pt; est care [address] [id number]  
[address] [facility] [address] [place] [address] Upcoming Health Maintenance Date Due  
 PAP AKA CERVICAL CYTOLOGY 9/1/1993 DTaP/Tdap/Td series (2 - Td) 1/1/2023 Allergies as of 1/23/2018  Review Complete On: 1/23/2018 By: Joey Pineda MD  
  
 Severity Noted Reaction Type Reactions Norvasc [Amlodipine]  02/10/2011   Side Effect Swelling Swelling to feet and ankles Paxil [Paroxetine Hcl]  12/04/2017    Other (comments) Serotonin SD Current Immunizations  Never Reviewed Name Date Influenza Vaccine 12/13/2017 Tdap 1/1/2013 Not reviewed this visit You Were Diagnosed With   
  
 Codes Comments Essential hypertension    -  Primary ICD-10-CM: I10 
ICD-9-CM: 401.9 Anxiety     ICD-10-CM: F41.9 ICD-9-CM: 300.00 Reactive depression     ICD-10-CM: F32.9 ICD-9-CM: 300.4 Pure hypercholesterolemia     ICD-10-CM: E78.00 ICD-9-CM: 272.0 Breast screening     ICD-10-CM: Z12.31 
ICD-9-CM: V76.10 Vitals BP Pulse Temp Resp Height(growth percentile) Weight(growth percentile) 115/77 (BP 1 Location: Left arm, BP Patient Position: Sitting) 86 98.3 °F (36.8 °C) (Oral) 16 5' 6\" (1.676 m) 174 lb (78.9 kg) SpO2 BMI OB Status Smoking Status 95% 28.08 kg/m2 IUD Never Smoker BMI and BSA Data Body Mass Index Body Surface Area 28.08 kg/m 2 1.92 m 2 Preferred Pharmacy Pharmacy Name Phone [phone] [facility] [phone] Your Updated Medication List  
  
   
This list is accurate as of: 1/23/18 10:52 AM.  Always use your most recent med list.  
  
  
  
  
 butalbital-acetaminophen-caffeine -40 mg per tablet Commonly known as:  Denver Sorrow Take 1 Tab by mouth every six (6) hours as needed for Headache. Max Daily Amount: 4 Tabs. clonazePAM 0.5 mg tablet Commonly known as:  Raymona Ciara Take 1 Tab by mouth nightly as needed. Max Daily Amount: 0.5 mg.  
  
 EMERGEN-C 1,000 mg Pwep Generic drug:  Ascorbic Acid-Multivits-Min Take 1 Packet by mouth nightly as needed. escitalopram oxalate 20 mg tablet Commonly known as:  [place] Take 1 Tab by mouth daily. LIPITOR 20 mg tablet Generic drug:  atorvastatin Take  by mouth daily. lisinopril-hydroCHLOROthiazide 20-12.5 mg per tablet Commonly known as:  Marvel Светлана Take  by mouth daily. MELATONIN PO Take 3 Tabs by mouth nightly as needed (sleep aid). Prescriptions Printed Refills  
 clonazePAM (KLONOPIN) 0.5 mg tablet 3 Sig: Take 1 Tab by mouth nightly as needed. Max Daily Amount: 0.5 mg.  
 Class: Print Route: Oral  
  
Prescriptions Sent to Pharmacy Refills  
 escitalopram oxalate (LEXAPRO) 20 mg tablet 3 Sig: Take 1 Tab by mouth daily. Class: Normal  
 Pharmacy: [address] #: [phone] Route: Oral  
  
We Performed the Following REFERRAL TO PSYCHIATRY [REF91 Custom] Follow-up Instructions Return in about 3 months (around 4/23/2018). To-Do List   
 01/23/2018 Imaging:  [facility] MAMMO BI SCREENING INCL CAD Referral Information Referral ID Referred By Referred To  
  
 [id number] Olga Isaac MD   
   [address] [place], [address] Phone: [phone] Fax: [phone] Visits Status Start Date End Date 1 New Request 1/23/18 1/23/19 If your referral has a status of pending review or denied, additional information will be sent to support the outcome of this decision. Patient Instructions Increase lexapro to 20mg Lab today Cancel march 2nd appointment Introducing [facility]! [facility] introduces [employer] patient portal. Now you can access parts of your medical record, email your doctor's office, and request medication refills online. 1. In your internet browser, go to [email or web address] 2. Click on the First Time User? Click Here link in the Sign In box. You will see the New Member Sign Up page. 3. Enter your [employer] Access Code exactly as it appears below. You will not need to use this code after youve completed the sign-up process. If you do not sign up before the expiration date, you must request a new code. · [employer] Access Code: [email or web address] Expires: 3/5/2018  5:13 PM 
 
4. Enter the last four digits of your Social Security Number (xxxx) and Date of Birth (mm/dd/yyyy) as indicated and click Submit. You will be taken to the next sign-up page. 5. Create a [employer] ID. This will be your [employer] login ID and cannot be changed, so think of one that is secure and easy to remember. 6. Create a [employer] password. You can change your password at any time. 7. Enter your Password Reset Question and Answer. This can be used at a later time if you forget your password. 8. Enter your e-mail address. You will receive e-mail notification when new information is available in [address] 9. Click Sign Up. You can now view and download portions of your medical record. 10. Click the Download Summary menu link to download a portable copy of your medical information. If you have questions, please visit the Frequently Asked Questions section of the [employer] website. Remember, [employer] is NOT to be used for urgent needs. For medical emergencies, dial 911. Now available from your iPhone and Android! Please provide this summary of care documentation to your next provider. Your primary care clinician is listed as Francy Gamble. If you have any questions after today's visit, please call [phone].

## 2018-01-24 LAB
ALBUMIN SERPL-MCNC: 4.2 G/DL (ref 3.5–5.5)
ALBUMIN/GLOB SERPL: 1.6 {RATIO} (ref 1.2–2.2)
ALP SERPL-CCNC: 48 IU/L (ref 39–117)
ALT SERPL-CCNC: 29 IU/L (ref 0–32)
AST SERPL-CCNC: 26 IU/L (ref 0–40)
BILIRUB SERPL-MCNC: 0.4 MG/DL (ref 0–1.2)
BUN SERPL-MCNC: 15 MG/DL (ref 6–24)
BUN/CREAT SERPL: 16 (ref 9–23)
CALCIUM SERPL-MCNC: 9.1 MG/DL (ref 8.7–10.2)
CHLORIDE SERPL-SCNC: 103 MMOL/L (ref 96–106)
CHOLEST SERPL-MCNC: 173 MG/DL (ref 100–199)
CO2 SERPL-SCNC: 23 MMOL/L (ref 18–29)
CREAT SERPL-MCNC: 0.91 MG/DL (ref 0.57–1)
ERYTHROCYTE [SEDIMENTATION RATE] IN BLOOD BY WESTERGREN METHOD: 5 MM/HR (ref 0–32)
GLOBULIN SER CALC-MCNC: 2.6 G/DL (ref 1.5–4.5)
GLUCOSE SERPL-MCNC: 89 MG/DL (ref 65–99)
HDLC SERPL-MCNC: 48 MG/DL
LDLC SERPL CALC-MCNC: 101 MG/DL (ref 0–99)
POTASSIUM SERPL-SCNC: 4.2 MMOL/L (ref 3.5–5.2)
PROT SERPL-MCNC: 6.8 G/DL (ref 6–8.5)
SODIUM SERPL-SCNC: 141 MMOL/L (ref 134–144)
TRIGL SERPL-MCNC: 119 MG/DL (ref 0–149)
VLDLC SERPL CALC-MCNC: 24 MG/DL (ref 5–40)

## 2018-01-25 ENCOUNTER — TELEPHONE (OUTPATIENT)
Dept: INTERNAL MEDICINE CLINIC | Age: 48
End: 2018-01-25

## 2018-01-25 NOTE — TELEPHONE ENCOUNTER
Outbound call to patient, voice message left w/contact information requesting a return call. Tia Pacini of referral re: patient will lose health insurance via employer.       CARLOS Richardson Si

## 2018-01-29 ENCOUNTER — TELEPHONE (OUTPATIENT)
Dept: INTERNAL MEDICINE CLINIC | Age: 48
End: 2018-01-29

## 2018-01-29 NOTE — TELEPHONE ENCOUNTER
11:58 am, Inbound call from patient, identifiers ( & address) verified per HIPAA policy. Identified self, role and nature of the call/referral.  Pt acknowledged understanding. Pt voiced the following information to the writer:    -health insurance through previous employer will  today. Does have an HSA account/card for medical expenses. Still has access to it. Recv'd information today regarding COBRA care $600/month; dental and vision aren't included. Will contact HR dept to see if she can use her HSA to cover x 1 mo/of COBRA for her sons' ages (25, 25 a senior in high school and 12). 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 Dad has Alzheimer's and Mom has COPD, CHF and a stroke. Will meet w/someone from a local Hospice agency to discuss options for her parents. Dad isn't ready to be placed in a nursing home, as he's still active (drives). However he does have moments of angry fits. Desires to keep her parents at home.      -brother resides in [place] and is the POA. -actively seeking employment; and in the process of filing for bankruptcy.      -Dr. Julissa Valenzuela, provided me w/the names of psychiatrists (male/female). I don't feel comfortable w/talking to a man, as I will search for a female psychiatrist.      Markos Renner discussed and addressed the following:     -obtaining health insurance possibly through DSS for her children.      -suggested she apply for the Care Care; and health insurance through the Marketplace to determine eligibility. Plan    -writer and pt verbally agreed to F/U w/one another regarding the current issues. CARLOS Bob                                          10:48 am, Outbound F/U call to patient, voice message left w/contact information requesting a return phone call.     CARLOS Bob

## 2018-01-30 ENCOUNTER — TELEPHONE (OUTPATIENT)
Dept: INTERNAL MEDICINE CLINIC | Age: 48
End: 2018-01-30

## 2018-01-30 NOTE — TELEPHONE ENCOUNTER
#[phone] pt states the physiatrist needs most recent o/n to include certain things in those notes she states. Please call for details as I let her know they would generally fax a request for those notes. Advised pt that it is a 24 hour turn around on all calls as she is asking for this all to be done by this afternoon. Please call for details.

## 2018-01-30 NOTE — TELEPHONE ENCOUNTER
Called, spoke to pt. Two pt identifiers confirmed. Pt states that psych needs office notes from [address] [place] [address]. Pt states that Dr. Chelsea Self wasn't seeing new patients. Pt states that if the last office notes from Dr. Richi Martínez include, then Dr. Chelsea Self will consider taking on this pt:  1. Parents alzh/dem-dad; mom copd/hf. 2. Pt 3 kids, 18yr old-facing charges. 3. Filing bankrupcy. 4. Fiance left pt shortly after thanksgiving. 5. Lost job last week. 6. Only main provider of the household. Pt states that these need to be faxed to Dr. Kelly Tejada:Fax [phone]. Pt informed Dr. Richi Martínez will be notified. Pt verbalized understanding of information discussed w/ no further questions at this time.

## 2018-01-31 NOTE — TELEPHONE ENCOUNTER
MD Kati Wilson LPN        Caller: Unspecified (Yesterday,  9:45 AM)                     Most of the below was already in note     I added what was not     done

## 2018-01-31 NOTE — TELEPHONE ENCOUNTER
Called, spoke to pt. Two pt identifiers confirmed. Pt informed per Dr. Kamilah Lundberg that notes were addended and faxed to Dr. Farrell Cluster office. Pt verbalized understanding of information discussed w/ no further questions at this time.

## 2018-02-05 ENCOUNTER — TELEPHONE (OUTPATIENT)
Dept: INTERNAL MEDICINE CLINIC | Age: 48
End: 2018-02-05

## 2018-02-05 NOTE — TELEPHONE ENCOUNTER
Outbound F/U call to patient. Voice message left w/contact information requesting a return phone call. Golden Gu of the call: information regarding Medicaid for her underage children.       CARLOS Hays

## 2018-04-23 ENCOUNTER — TELEPHONE (OUTPATIENT)
Dept: INTERNAL MEDICINE CLINIC | Age: 48
End: 2018-04-23

## 2018-04-23 DIAGNOSIS — F41.9 ANXIETY: ICD-10-CM

## 2018-04-23 RX ORDER — CLONAZEPAM 0.5 MG/1
0.5 TABLET ORAL
Qty: 30 TAB | Refills: 0 | Status: SHIPPED | OUTPATIENT
Start: 2018-04-23 | End: 2018-05-04 | Stop reason: SDUPTHER

## 2018-04-23 NOTE — TELEPHONE ENCOUNTER
Outbound F/U call to patient. Patient cancelled appt for today, and re-scheduled appt w/PCP for (18). Pt identifiers ( & address) verified per HIPAA policy. Identified self, role and nature of the call, pt acknowledged understanding. Pt informed clinician of the following information:     -her/her kids are currently insured under COBRA plan through former employer. Has coverage x 90 days, and parents will assist her w/paying for COBRA care until she's employed again.      -ran out of Klonopin 0.5 mg recently, has had two more episodes of muscular twitching which has lasted btwn 1-2 days. Pt is requesting a refill w/dosage adjustment or adjustment for amount to be taken w/instructions. -P/U Lisinopril on 18 90 day supply. -P/U Lexapro on 18 30 day supply. Will run out on 18.  -Lipitor 20 mg last filled on 18 90 day supply requires a authorization. Will run out on May 13th, 2018. Writer provided the telephone # to [address] [place] [address] ([employer]-for children under 23years of age) [phone]. Pt voiced the following information be relayed to PCP for further evaluation.       CARLOS Butcher

## 2018-04-24 NOTE — TELEPHONE ENCOUNTER
MD Aaliyah Hi LPN        Caller: Unspecified (Yesterday,  1:30 PM)                     Move up her appointment to may     Will give 1 month rf of klonopin at qhs dosing     WILL NOT increase dose, controled     She is supposed to see psychiatry --this was discussed previously     Can rf other meds, please pend rf

## 2018-04-25 ENCOUNTER — TELEPHONE (OUTPATIENT)
Dept: INTERNAL MEDICINE CLINIC | Age: 48
End: 2018-04-25

## 2018-04-25 NOTE — TELEPHONE ENCOUNTER
Following rx was faxed to pharmacy with confirmation received:      clonazePAM (KLONOPIN) 0.5 mg tablet  Take 1 Tab by mouth nightly as needed.  Max Daily Amount: 0.5 mg.  Disp: 30 Tab Refills: 0    Class: Print Start: 4/23/2018   For: Anxiety

## 2018-04-25 NOTE — TELEPHONE ENCOUNTER
Called, spoke to pt. Two pt identifiers confirmed. Pt informed per Dr. Cait Vuong needs sooner appt. Pt offered and accepted appt for 5/1/18 1015. Pt asking for refill-pended. Pt verbalized understanding of information discussed w/ no further questions at this time.

## 2018-04-26 RX ORDER — CLONAZEPAM 0.5 MG/1
0.5 TABLET ORAL
Qty: 30 TAB | Refills: 0 | OUTPATIENT
Start: 2018-04-26

## 2018-05-01 ENCOUNTER — TELEPHONE (OUTPATIENT)
Dept: INTERNAL MEDICINE CLINIC | Age: 48
End: 2018-05-01

## 2018-05-01 DIAGNOSIS — F41.9 ANXIETY: ICD-10-CM

## 2018-05-01 RX ORDER — CLONAZEPAM 0.5 MG/1
0.5 TABLET ORAL
Qty: 30 TAB | Refills: 0 | Status: CANCELLED | OUTPATIENT
Start: 2018-05-01

## 2018-05-01 NOTE — TELEPHONE ENCOUNTER
----- Message from Dick Bullock sent at 5/1/2018  1:30 PM EDT -----  Regarding: Dr. Stringer/ Telephone  Patient missed a call from [place] at the office. Please call her back at [phone].  Please call her before 2pm or after 4pm        Message copied/pasted from [facility]

## 2018-05-01 NOTE — TELEPHONE ENCOUNTER
----- Message from Roslyn Rocha sent at 5/1/2018  9:41 AM EDT -----  Regarding: Dr. Stringer/ Telephone  Patient would like a call back from the nurse regarding r/s her appt. Need an asap for med refills.  Contact is [phone]        Message copied/pasted from [facility]

## 2018-05-01 NOTE — TELEPHONE ENCOUNTER
Called, spoke to pt. Two pt identifiers confirmed. Pt offered and accepted appt for 5/4/18 1500. Pt verbalized understanding of information discussed w/ no further questions at this time.

## 2018-05-01 NOTE — TELEPHONE ENCOUNTER
No rf till ov keeps canceling, will fill at ov if indicated    Gave 30tabs on 4/23, this is for one nightly, would last till 5/23

## 2018-05-04 ENCOUNTER — OFFICE VISIT (OUTPATIENT)
Dept: INTERNAL MEDICINE CLINIC | Age: 48
End: 2018-05-04

## 2018-05-04 VITALS
BODY MASS INDEX: 29.41 KG/M2 | SYSTOLIC BLOOD PRESSURE: 114 MMHG | TEMPERATURE: 97.9 F | RESPIRATION RATE: 16 BRPM | HEIGHT: 66 IN | WEIGHT: 183 LBS | HEART RATE: 97 BPM | OXYGEN SATURATION: 95 % | DIASTOLIC BLOOD PRESSURE: 72 MMHG

## 2018-05-04 DIAGNOSIS — E78.00 PURE HYPERCHOLESTEROLEMIA: ICD-10-CM

## 2018-05-04 DIAGNOSIS — E66.3 OVERWEIGHT: ICD-10-CM

## 2018-05-04 DIAGNOSIS — F41.9 ANXIETY: ICD-10-CM

## 2018-05-04 DIAGNOSIS — I10 ESSENTIAL HYPERTENSION: Primary | ICD-10-CM

## 2018-05-04 RX ORDER — ATORVASTATIN CALCIUM 20 MG/1
20 TABLET, FILM COATED ORAL DAILY
Qty: 90 TAB | Refills: 1 | Status: SHIPPED | OUTPATIENT
Start: 2018-05-04 | End: 2018-08-07 | Stop reason: SDUPTHER

## 2018-05-04 RX ORDER — BUSPIRONE HYDROCHLORIDE 10 MG/1
10 TABLET ORAL
Qty: 30 TAB | Refills: 3 | Status: SHIPPED | OUTPATIENT
Start: 2018-05-04 | End: 2018-08-25 | Stop reason: SDUPTHER

## 2018-05-04 RX ORDER — CLONAZEPAM 0.5 MG/1
0.5 TABLET ORAL
Qty: 30 TAB | Refills: 0 | Status: SHIPPED | OUTPATIENT
Start: 2018-05-04 | End: 2018-08-07 | Stop reason: SDUPTHER

## 2018-05-04 RX ORDER — ESCITALOPRAM OXALATE 10 MG/1
10 TABLET ORAL DAILY
Qty: 30 TAB | Refills: 3 | Status: SHIPPED | OUTPATIENT
Start: 2018-05-04 | End: 2018-08-07 | Stop reason: SDUPTHER

## 2018-05-04 RX ORDER — LISINOPRIL AND HYDROCHLOROTHIAZIDE 12.5; 2 MG/1; MG/1
1 TABLET ORAL DAILY
Qty: 90 TAB | Refills: 1 | Status: SHIPPED | OUTPATIENT
Start: 2018-05-04 | End: 2018-08-07 | Stop reason: SDUPTHER

## 2018-05-04 NOTE — MR AVS SNAPSHOT
Davide Salgado [facility] [address] 
[phone] Patient: Lanette Snider MRN: [id number] JGF:[id number] Visit Information Date & Time Provider Department Dept. Phone Encounter #  
 5/4/2018  3:00 PM Mallory Hsieh, [address] [phone] [id number] Follow-up Instructions Return in about 3 months (around 8/4/2018). Your Appointments 6/1/2018  1:00 PM  
ROUTINE CARE with Mallory Hsieh, [address] Sheridan Atkinson) Appt Note: 3 month follow up; rcm,lvm; r/s from 4/23/18  
 [facility] [address] [id number]  
[address] [place] [address] [address] Upcoming Health Maintenance Date Due  
 PAP AKA CERVICAL CYTOLOGY 9/1/1993 Influenza Age 5 to Adult 8/1/2018 DTaP/Tdap/Td series (2 - Td) 1/1/2023 Allergies as of 5/4/2018  Review Complete On: 5/4/2018 By: Reynaldo Marcelino LPN Severity Noted Reaction Type Reactions Norvasc [Amlodipine]  02/10/2011   Side Effect Swelling Swelling to feet and ankles Paxil [Paroxetine Hcl]  12/04/2017    Other (comments) Serotonin SD Current Immunizations  Never Reviewed Name Date Influenza Vaccine 12/13/2017 Tdap 1/1/2013 Not reviewed this visit You Were Diagnosed With   
  
 Codes Comments Essential hypertension    -  Primary ICD-10-CM: I10 
ICD-9-CM: 401.9 Anxiety     ICD-10-CM: F41.9 ICD-9-CM: 300.00 Pure hypercholesterolemia     ICD-10-CM: E78.00 ICD-9-CM: 272.0 Overweight     ICD-10-CM: C19.9 ICD-9-CM: 278.02 Vitals BP Pulse Temp Resp Height(growth percentile) Weight(growth percentile) 114/72 (BP 1 Location: Left arm, BP Patient Position: Sitting) 97 97.9 °F (36.6 °C) (Oral) 16 5' 6\" (1.676 m) 183 lb (83 kg) SpO2 BMI OB Status Smoking Status 95% 29.54 kg/m2 IUD Never Smoker Vitals History BMI and BSA Data Body Mass Index Body Surface Area  
 29.54 kg/m 2 1.97 m 2 Preferred Pharmacy Pharmacy Name Phone [facility]/PHARMACY #[id number]- [id number] [facility] [phone] Your Updated Medication List  
  
   
This list is accurate as of 5/4/18  3:21 PM.  Always use your most recent med list.  
  
  
  
  
 atorvastatin 20 mg tablet Commonly known as:  LIPITOR Take 1 Tab by mouth daily. busPIRone 10 mg tablet Commonly known as:  BUSPAR Take 1 Tab by mouth nightly. butalbital-acetaminophen-caffeine -40 mg per tablet Commonly known as:  Maira Kami Take 1 Tab by mouth every six (6) hours as needed for Headache. Max Daily Amount: 4 Tabs. clonazePAM 0.5 mg tablet Commonly known as:  Dickson Rasmussen Take 1 Tab by mouth nightly as needed. Max Daily Amount: 0.5 mg.  
  
 EMERGEN-C 1,000 mg Pwep Generic drug:  Ascorbic Acid-Multivits-Min Take 1 Packet by mouth nightly as needed. escitalopram oxalate 10 mg tablet Commonly known as:  Rush Rye Take 1 Tab by mouth daily. lisinopril-hydroCHLOROthiazide 20-12.5 mg per tablet Commonly known as:  Roberto Sinning Take 1 Tab by mouth daily. MELATONIN PO Take 3 Tabs by mouth nightly as needed (sleep aid). Prescriptions Printed Refills  
 clonazePAM (KLONOPIN) 0.5 mg tablet 0 Sig: Take 1 Tab by mouth nightly as needed. Max Daily Amount: 0.5 mg.  
 Class: Print Route: Oral  
  
Prescriptions Sent to Pharmacy Refills  
 lisinopril-hydroCHLOROthiazide (PRINZIDE, ZESTORETIC) 20-12.5 mg per tablet 1 Sig: Take 1 Tab by mouth daily. Class: Normal  
 Pharmacy: [place] [address] [facility] [address] [place] [address] #: [phone] Route: Oral  
 atorvastatin (LIPITOR) 20 mg tablet 1 Sig: Take 1 Tab by mouth daily.   
 Class: Normal  
 Pharmacy: [facility]/pharmacy #[id number] - [address] , [address] [place] [address] Ph #: [phone] Route: Oral  
 escitalopram oxalate (LEXAPRO) 10 mg tablet 3 Sig: Take 1 Tab by mouth daily. Class: Normal  
 Pharmacy: [address] Ph #: [phone] Route: Oral  
 busPIRone (BUSPAR) 10 mg tablet 3 Sig: Take 1 Tab by mouth nightly. Class: Normal  
 Pharmacy: [address] Ph #: [phone] Route: Oral  
  
Follow-up Instructions Return in about 3 months (around 8/4/2018). Patient Instructions Decrease lexapro to 10mg Start buspar in the evening Schedule mammogram and pelvic. Introducing [facility] & HEALTH SERVICES! [facility] introduces [employer] patient portal. Now you can access parts of your medical record, email your doctor's office, and request medication refills online. 1. In your internet browser, go to [email or web address] 2. Click on the First Time User? Click Here link in the Sign In box. You will see the New Member Sign Up page. 3. Enter your [employer] Access Code exactly as it appears below. You will not need to use this code after youve completed the sign-up process. If you do not sign up before the expiration date, you must request a new code. · [employer] Access Code: [email or web address] Expires: 8/2/2018  3:21 PM 
 
4. Enter the last four digits of your Social Security Number (xxxx) and Date of Birth (mm/dd/yyyy) as indicated and click Submit. You will be taken to the next sign-up page. 5. Create a K[employer]der ID. This will be your [employer] login ID and cannot be changed, so think of one that is secure and easy to remember. 6. Create a [employer] password. You can change your password at any time. 7. Enter your Password Reset Question and Answer. This can be used at a later time if you forget your password. 8. Enter your e-mail address. You will receive e-mail notification when new information is available in [address] 9. Click Sign Up. You can now view and download portions of your medical record. 10. Click the Download Summary menu link to download a portable copy of your medical information. If you have questions, please visit the Frequently Asked Questions section of the cisimple website. Remember, cisimple is NOT to be used for urgent needs. For medical emergencies, dial 911. Now available from your iPhone and Android! Please provide this summary of care documentation to your next provider. Your primary care clinician is listed as Elif Martinez. If you have any questions after today's visit, please call [phone].

## 2018-05-04 NOTE — PROGRESS NOTES
HISTORY OF PRESENT ILLNESS  Kierra Llanos is a 41 y.o. female. HPI   Last here 1/23/18. Pt is here for routine care.       BP is 114/72  BPs are 114/80s at home  Continues lisinopril-HCTZ 20-12.5mg daily        Wt is up 9 lbs x lov   Discussed enrolling in [facility]      Reviewed labs 1/18   Will get labs today     Reviewed depression screen 05/04/18: +4  Lov, I advised her to establish with a psych   Pt was unable to get an appt with Dr. Fifi Schmidt (psych)  Pt cannot afford to see a psych, as she is unemployed at the moment   Advised her to call [facility]   Provided referral for psychs associated with [employer], I increased her lexapro to 20mg daily  Pt does not believe that Camila Pacheco is working well  Pt has had 3 panic attacks x lov   Lov, I told the pt that she could take klonopin up to BID  Pt has not filled the rx for klonopin, which was refilled on 4/23/18  Pt will  the rx from the pharmacy   Will decrease lexapro to 10mg daily  Will add on buspar 10mg qhs up to BID  Recall pt tried and failed paxil. Pt was admitted to the hospital for paresthesias and L-sided twitching 12/4/17-12/5/17, she believes that her sx were d/t anxiety, it was not r/o that she had a seizure, will not start her on wellbutrin for this reason.     Continues lipitor 20mg daily     Continues vit D 1000U OTC daily    Pt follows with Dr. Pee Ratliff (neuro) for HAs  Last visit was 12/4/17  Continues fioricet for HAs, which works well   Pt will only follow up with this physician prn       PREVENTIVE:  Colonoscopy: not yet needed, denies [facility]  Pap: Dr. Calista Green, 2016, due, scheduled, canceled, ordered, reminded 5/18  Mammogram: ~2015, due, scheduled, canceled, ordered, reminded 5/18  Dexa: not yet needed  Tdap: 2013  Pneumovax: not yet needed  Shingrix: not yet needed  Flu shot: Fall 2017  Eye exam: 2017 at [facility]  A1C: 12/17 5.6  Lipids: 1/18     Patient Active Problem List    Diagnosis Date Noted    Hypertension 12/04/2017    Hyperlipidemia 12/04/2017    Paresthesia 12/04/2017    Transient ischemic attack involving right internal carotid artery 12/04/2017    Bilateral carotid artery stenosis 12/04/2017    Nonintractable epilepsy with complex partial seizures ([facility] [address] 12/04/2017    Altered mental status, unspecified 12/04/2017    Left sided numbness 12/04/2017    Cervical post-laminectomy syndrome 12/04/2017     Current Outpatient Prescriptions   Medication Sig Dispense Refill    clonazePAM (KLONOPIN) 0.5 mg tablet Take 1 Tab by mouth nightly as needed. Max Daily Amount: 0.5 mg. 30 Tab 0    escitalopram oxalate (LEXAPRO) 20 mg tablet Take 1 Tab by mouth daily. 30 Tab 3    lisinopril-hydroCHLOROthiazide (PRINZIDE, ZESTORETIC) 20-12.5 mg per tablet Take  by mouth daily.  atorvastatin (LIPITOR) 20 mg tablet Take  by mouth daily.  MELATONIN PO Take 3 Tabs by mouth nightly as needed (sleep aid).  butalbital-acetaminophen-caffeine (FIORICET, ESGIC) -40 mg per tablet Take 1 Tab by mouth every six (6) hours as needed for Headache. Max Daily Amount: 4 Tabs. 20 Tab 0    Ascorbic Acid-Multivits-Min (EMERGEN-C) 1,000 mg pwep Take 1 Packet by mouth nightly as needed.        Past Surgical History:   Procedure Laterality Date    HX CHOLECYSTECTOMY      HX ORTHOPAEDIC      herniated disc     HX UROLOGICAL      URETHRAL REPAIR      Lab Results  Component Value Date/Time   WBC 8.7 12/05/2017 04:48 AM   HGB 12.9 12/05/2017 04:48 AM   HCT 38.8 12/05/2017 04:48 AM   PLATELET 413 20/63/8690 04:48 AM   MCV 90.0 12/05/2017 04:48 AM     Lab Results  Component Value Date/Time   Cholesterol, total 173 01/23/2018 10:59 AM   HDL Cholesterol 48 01/23/2018 10:59 AM   LDL, calculated 101 (H) 01/23/2018 10:59 AM   Triglyceride 119 01/23/2018 10:59 AM   CHOL/HDL Ratio 5.6 (H) 12/04/2017 01:31 PM     Lab Results  Component Value Date/Time   GFR est non-AA 76 01/23/2018 10:59 AM   GFR est AA 88 01/23/2018 10:59 AM Creatinine 0.91 01/23/2018 10:59 AM   BUN 15 01/23/2018 10:59 AM   Sodium 141 01/23/2018 10:59 AM   Potassium 4.2 01/23/2018 10:59 AM   Chloride 103 01/23/2018 10:59 AM   CO2 23 01/23/2018 10:59 AM   Magnesium 2.0 12/05/2017 04:48 AM        Review of Systems   Respiratory: Negative for shortness of breath. Cardiovascular: Negative for chest pain. Physical Exam   Constitutional: She is oriented to person, place, and time. She appears well-developed and well-nourished. No distress. HENT:   Head: Normocephalic and atraumatic. Eyes: Conjunctivae and EOM are normal. Right eye exhibits no discharge. Left eye exhibits no discharge. Neck: Normal range of motion. Neck supple. Cardiovascular: Normal rate, regular rhythm and normal heart sounds. Exam reveals no gallop and no friction rub. No murmur heard. Pulmonary/Chest: Effort normal and breath sounds normal. No respiratory distress. She has no wheezes. She has no rales. She exhibits no tenderness. Musculoskeletal: Normal range of motion. She exhibits no edema, tenderness or deformity. Lymphadenopathy:     She has no cervical adenopathy. Neurological: She is alert and oriented to person, place, and time. Coordination normal.   Skin: Skin is warm and dry. No rash noted. She is not diaphoretic. No erythema. No pallor. Psychiatric: She has a normal mood and affect. Her behavior is normal.       ASSESSMENT and PLAN    ICD-10-CM ICD-9-CM    1. Essential hypertension    Controled on lisinopril-HCTZ, continue, no change to dose    I10 401.9    2.  Anxiety    Pt with anxiety and depression, reviewed  and called pharmacy, she filled her klonopin on 4/16/18, there are 3 refills on it, it was ordered for once nightly dosing so no new refills will be made today    Will add Buspar to further help with anxiety, will decrease lexapro and again advised her to see psych, referral has already been placed, provided list of psychs in the area     Avoid wellbutrin d/t h/o possible sz   F41.9 300.00 clonazePAM (KLONOPIN) 0.5 mg tablet   3. Pure hypercholesterolemia    Near goal on lipitor, work on w/l   E78.00 272.0    4. Overweight    Up 10 lbs from lov, will decrease lexapro in case this was contributing to her wt gain, discussed [employer]   E66.3 278.02       Depression screen reviewed and positive (4). Pt is out of work. Pt has not f/u with a psych. Will decrease lexapro to 10mg daily. Avoid wellbutrin d/t h/o possible Samantha add on buspar 10mg qhs up to BID. Will have her fill rx for klonopin. Scribed by Devon Flores of [facility], as dictated by Dr. Elizabeth Quinones. Current diagnosis and concerns discussed with pt at length. Pt understands risks and benefits or current treatment plan and medications, and accepts the treatment and medication with any possible risks. Pt asks appropriate questions, which were answered. Pt was instructed to call with any concerns or problems. This note will not be viewable in [address]

## 2018-08-07 ENCOUNTER — OFFICE VISIT (OUTPATIENT)
Dept: INTERNAL MEDICINE CLINIC | Age: 48
End: 2018-08-07

## 2018-08-07 VITALS
RESPIRATION RATE: 16 BRPM | HEART RATE: 84 BPM | TEMPERATURE: 98.1 F | BODY MASS INDEX: 31.02 KG/M2 | WEIGHT: 193 LBS | OXYGEN SATURATION: 97 % | HEIGHT: 66 IN | DIASTOLIC BLOOD PRESSURE: 88 MMHG | SYSTOLIC BLOOD PRESSURE: 127 MMHG

## 2018-08-07 DIAGNOSIS — F41.9 ANXIETY: ICD-10-CM

## 2018-08-07 DIAGNOSIS — E66.9 OBESITY (BMI 30.0-34.9): ICD-10-CM

## 2018-08-07 DIAGNOSIS — I10 ESSENTIAL HYPERTENSION: ICD-10-CM

## 2018-08-07 DIAGNOSIS — R30.0 DYSURIA: Primary | ICD-10-CM

## 2018-08-07 DIAGNOSIS — F32.9 REACTIVE DEPRESSION: ICD-10-CM

## 2018-08-07 DIAGNOSIS — E78.00 PURE HYPERCHOLESTEROLEMIA: ICD-10-CM

## 2018-08-07 DIAGNOSIS — R73.01 IFG (IMPAIRED FASTING GLUCOSE): ICD-10-CM

## 2018-08-07 LAB
BILIRUB UR QL STRIP: NEGATIVE
GLUCOSE UR-MCNC: NEGATIVE MG/DL
KETONES P FAST UR STRIP-MCNC: NEGATIVE MG/DL
PH UR STRIP: 6 [PH] (ref 4.6–8)
PROT UR QL STRIP: NORMAL
SP GR UR STRIP: 1.02 (ref 1–1.03)
UA UROBILINOGEN AMB POC: NORMAL (ref 0.2–1)
URINALYSIS CLARITY POC: CLEAR
URINALYSIS COLOR POC: YELLOW
URINE BLOOD POC: NORMAL
URINE LEUKOCYTES POC: NORMAL
URINE NITRITES POC: POSITIVE

## 2018-08-07 RX ORDER — CIPROFLOXACIN 250 MG/1
250 TABLET, FILM COATED ORAL 2 TIMES DAILY
Qty: 10 TAB | Refills: 0 | Status: SHIPPED | OUTPATIENT
Start: 2018-08-07 | End: 2018-08-12

## 2018-08-07 RX ORDER — CLONAZEPAM 0.5 MG/1
0.5 TABLET ORAL
Qty: 30 TAB | Refills: 3 | Status: SHIPPED | OUTPATIENT
Start: 2018-08-07 | End: 2018-12-03 | Stop reason: SDUPTHER

## 2018-08-07 RX ORDER — BUSPIRONE HYDROCHLORIDE 10 MG/1
10 TABLET ORAL 2 TIMES DAILY
Qty: 180 TAB | Refills: 1 | Status: SHIPPED | OUTPATIENT
Start: 2018-08-07 | End: 2020-03-17

## 2018-08-07 RX ORDER — ATORVASTATIN CALCIUM 20 MG/1
20 TABLET, FILM COATED ORAL DAILY
Qty: 90 TAB | Refills: 1 | Status: SHIPPED | OUTPATIENT
Start: 2018-08-07 | End: 2022-05-06 | Stop reason: SDUPTHER

## 2018-08-07 RX ORDER — LISINOPRIL AND HYDROCHLOROTHIAZIDE 12.5; 2 MG/1; MG/1
1 TABLET ORAL DAILY
Qty: 90 TAB | Refills: 1 | Status: SHIPPED | OUTPATIENT
Start: 2018-08-07 | End: 2020-03-17 | Stop reason: SDUPTHER

## 2018-08-07 RX ORDER — ESCITALOPRAM OXALATE 10 MG/1
10 TABLET ORAL DAILY
Qty: 90 TAB | Refills: 1 | Status: SHIPPED | OUTPATIENT
Start: 2018-08-07 | End: 2020-03-17

## 2018-08-07 NOTE — MR AVS SNAPSHOT
[address] [place] [address] 
[phone] Patient: Cristal Gurrola MRN: [id number] KPA:[birth year] Visit Information Date & Time Provider Department Dept. Phone Encounter #  
 8/7/2018  3:00 PM [facility], [address] [place] [address] Avenue[address] [phone] [id number] Follow-up Instructions Return in about 6 months (around 2/7/2019). Upcoming Health Maintenance Date Due  
 PAP AKA CERVICAL CYTOLOGY 9/3/2019* Influenza Age 5 to Adult 9/3/2019* DTaP/Tdap/Td series (2 - Td) 1/1/2023 *Topic was postponed. The date shown is not the original due date. Allergies as of 8/7/2018  Review Complete On: 8/7/2018 By: Ibrahima Fox LPN Severity Noted Reaction Type Reactions Norvasc [Amlodipine]  02/10/2011   Side Effect Swelling Swelling to feet and ankles Paxil [Paroxetine Hcl]  12/04/2017    Other (comments) Serotonin SD Current Immunizations  Never Reviewed Name Date Influenza Vaccine 12/13/2017 Tdap 1/1/2013 Not reviewed this visit You Were Diagnosed With   
  
 Codes Comments Dysuria    -  Primary ICD-10-CM: R30.0 ICD-9-CM: 702. 1 Anxiety     ICD-10-CM: F41.9 ICD-9-CM: 300.00 Essential hypertension     ICD-10-CM: I10 
ICD-9-CM: 401.9 Pure hypercholesterolemia     ICD-10-CM: E78.00 ICD-9-CM: 272.0 Reactive depression     ICD-10-CM: F32.9 ICD-9-CM: 300.4 Obesity (BMI 30.0-34.9)     ICD-10-CM: N96.4 ICD-9-CM: 278.00 IFG (impaired fasting glucose)     ICD-10-CM: R73.01 
ICD-9-CM: 790.21 Vitals BP Pulse Temp Resp Height(growth percentile) Weight(growth percentile) 127/88 (BP 1 Location: Left arm, BP Patient Position: Sitting) 84 98.1 °F (36.7 °C) (Oral) 16 5' 6\" (1.676 m) 193 lb (87.5 kg) SpO2 BMI OB Status Smoking Status 97% 31.15 kg/m2 IUD Never Smoker Vitals History BMI and BSA Data Body Mass Index Body Surface Area  
 31.15 kg/m 2 2.02 m 2 Preferred Pharmacy Pharmacy Name Phone [facility]/PHARMACY #[id number]- [id number] [phone] Your Updated Medication List  
  
   
This list is accurate as of 8/7/18  3:29 PM.  Always use your most recent med list.  
  
  
  
  
 atorvastatin 20 mg tablet Commonly known as:  LIPITOR Take 1 Tab by mouth daily. busPIRone 10 mg tablet Commonly known as:  BUSPAR Take 1 Tab by mouth two (2) times a day. butalbital-acetaminophen-caffeine -40 mg per tablet Commonly known as:  Xochitl Reasoner Take 1 Tab by mouth every six (6) hours as needed for Headache. Max Daily Amount: 4 Tabs. ciprofloxacin HCl 250 mg tablet Commonly known as:  CIPRO Take 1 Tab by mouth two (2) times a day for 5 days. clonazePAM 0.5 mg tablet Commonly known as:  Fani Mary Carmen Take 1 Tab by mouth nightly as needed. Max Daily Amount: 0.5 mg.  
  
 EMERGEN-C 1,000 mg Pwep Generic drug:  Ascorbic Acid-Multivits-Min Take 1 Packet by mouth nightly as needed. escitalopram oxalate 10 mg tablet Commonly known as:  Natasha Cooper Take 1 Tab by mouth daily. lisinopril-hydroCHLOROthiazide 20-12.5 mg per tablet Commonly known as:  Gustavo Decent Take 1 Tab by mouth daily. MELATONIN PO Take 3 Tabs by mouth nightly as needed (sleep aid). Prescriptions Printed Refills  
 clonazePAM (KLONOPIN) 0.5 mg tablet 3 Sig: Take 1 Tab by mouth nightly as needed. Max Daily Amount: 0.5 mg.  
 Class: Print Route: Oral  
  
Prescriptions Sent to Pharmacy Refills  
 lisinopril-hydroCHLOROthiazide (PRINZIDE, ZESTORETIC) 20-12.5 mg per tablet 1 Sig: Take 1 Tab by mouth daily. Class: Normal  
 [facility] [address] [place] [address] Ph #: [phone]  Route: Oral  
 atorvastatin (LIPITOR) 20 mg tablet 1 Sig: Take 1 Tab by mouth daily. Class: Normal  
 Pharmacy: [address] Ph #: [phone] Route: Oral  
 escitalopram oxalate (LEXAPRO) 10 mg tablet 1 Sig: Take 1 Tab by mouth daily. Class: Normal  
 Pharmacy: [address] Ph #: [phone] Route: Oral  
 busPIRone (BUSPAR) 10 mg tablet 1 Sig: Take 1 Tab by mouth two (2) times a day. Class: Normal  
 Pharmacy: [address] Ph #: [phone] Route: Oral  
 ciprofloxacin HCl (CIPRO) 250 mg tablet 0 Sig: Take 1 Tab by mouth two (2) times a day for 5 days. Class: Normal  
 Pharmacy: [address] Ph #: [phone] Route: Oral  
  
We Performed the Following AMB POC URINALYSIS DIP STICK AUTO W/O MICRO [64550 CPT(R)] CBC W/O DIFF [33715 CPT(R)] HEMOGLOBIN A1C WITH EAG [30253 CPT(R)] METABOLIC PANEL, BASIC [38012 CPT(R)] TSH 3RD GENERATION [68843 CPT(R)] Follow-up Instructions Return in about 6 months (around 2/7/2019). Patient Instructions Drop off urine sample in 2 weeks Introducing [facility]! [facility] introduces [employer] patient portal. Now you can access parts of your medical record, email your doctor's office, and request medication refills online. 1. In your internet browser, go to [email or web address] 2. Click on the First Time User? Click Here link in the Sign In box. You will see the New Member Sign Up page. 3. Enter your [employer] Access Code exactly as it appears below. You will not need to use this code after youve completed the sign-up process.  If you do not sign up before the expiration date, you must request a new code. · [employer] Access Code: [email or web address] Expires: 11/5/2018  3:29 PM 
 
4. Enter the last four digits of your Social Security Number (xxxx) and Date of Birth (mm/dd/yyyy) as indicated and click Submit. You will be taken to the next sign-up page. 5. Create a [employer] ID. This will be your [employer] login ID and cannot be changed, so think of one that is secure and easy to remember. 6. Create a [employer] password. You can change your password at any time. 7. Enter your Password Reset Question and Answer. This can be used at a later time if you forget your password. 8. Enter your e-mail address. You will receive e-mail notification when new information is available in [address] 9. Click Sign Up. You can now view and download portions of your medical record. 10. Click the Download Summary menu link to download a portable copy of your medical information. If you have questions, please visit the Frequently Asked Questions section of the [employer] website. Remember, [employer] is NOT to be used for urgent needs. For medical emergencies, dial 911. Now available from your iPhone and Android! Please provide this summary of care documentation to your next provider. Your primary care clinician is listed as Roberto White. If you have any questions after today's visit, please call [phone].

## 2018-08-07 NOTE — PROGRESS NOTES
HISTORY OF PRESENT ILLNESS  Leslee Marques is a 41 y.o. female. HPI     Last here 1/23/18. Pt is here for routine care.       BP is 127/88  BPs at home are averaging 120/80s  She did not take her meds this AM--forgot  Continues lisinopril-HCTZ 20-12.5mg daily        Wt is up 10 lbs x 5/4/18  Discussed enrolling in [facility]   Discussed weight loss and exercise  Has wt loss program at work as well which she plans on joining  She has not started any weight regimen, she was more worried about working on her personal and professional life      Reviewed labs 1/18   Will get labs today     Pt c/o dysuria and low back pain x1 week. Not improved  She has been taking Peridium which was causing her urine to change to an orange color  Has h/o kidney stone in the past as well not sure if this is cause  Back pain is minimal   No blood, F/C, nausea, vomiting    She also complains of lateral L knee pain. Pt would like a cortisone injection in that knee because she has had relief in her R knee in the past.   Plans on f/u with dr Claudia Angeles whom she saw in the past    Reviewed depression screen and it is negative today. Regarding her depression and anxiety, she feels much better x LOV  Pt has a new job, family going well, has a boyfriend, going to [employer]  Her Buspar is working well once every evening this was  [place], taking 10mg qhs   and she takes Lexapro now 10mg  as well--was 20mg before  She is having trouble with sleep, Ambien was too strong in past-  Klonopin 0.5mg  Helped in past with sleep but she is out of this currently       Continues Lipitor 20mg daily      Continues Vit D 1000U OTC daily     Pt follows with Dr. Angélica Olmstead (neuro) for HAs  Last visit was 12/4/17  Takes Fioricet       PREVENTIVE:  Colonoscopy: not yet needed, [facility]  Pap: Dr. Candi Ramires, 2016.  Scheduling pap tomorrow at [address]: she is scheduling mamm[facility] tomorrow at [facility]  Dexa: not yet needed  Tdap: 2013  Pneumovax: not yet needed  Shingrix: not yet needed  Flu shot: Fall 2017  Due in fall   Eye exam: 2017 at [facility]--due  A1C: 12/17 5.6  Lipids: 1/18           Patient Active Problem List    Diagnosis Date Noted    Hypertension 12/04/2017    Hyperlipidemia 12/04/2017    Paresthesia 12/04/2017    Transient ischemic attack involving right internal carotid artery 12/04/2017    Bilateral carotid artery stenosis 12/04/2017    Nonintractable epilepsy with complex partial seizures ([facility] [address] 12/04/2017    Altered mental status, unspecified 12/04/2017    Left sided numbness 12/04/2017    Cervical post-laminectomy syndrome 12/04/2017     Current Outpatient Prescriptions   Medication Sig Dispense Refill    lisinopril-hydroCHLOROthiazide (PRINZIDE, ZESTORETIC) 20-12.5 mg per tablet Take 1 Tab by mouth daily. 90 Tab 1    atorvastatin (LIPITOR) 20 mg tablet Take 1 Tab by mouth daily. 90 Tab 1    escitalopram oxalate (LEXAPRO) 10 mg tablet Take 1 Tab by mouth daily. 90 Tab 1    clonazePAM (KLONOPIN) 0.5 mg tablet Take 1 Tab by mouth nightly as needed. Max Daily Amount: 0.5 mg. 30 Tab 3    busPIRone (BUSPAR) 10 mg tablet Take 1 Tab by mouth two (2) times a day. 180 Tab 1    ciprofloxacin HCl (CIPRO) 250 mg tablet Take 1 Tab by mouth two (2) times a day for 5 days. 10 Tab 0    butalbital-acetaminophen-caffeine (FIORICET, ESGIC) -40 mg per tablet Take 1 Tab by mouth every six (6) hours as needed for Headache. Max Daily Amount: 4 Tabs. 20 Tab 0    Ascorbic Acid-Multivits-Min (EMERGEN-C) 1,000 mg pwep Take 1 Packet by mouth nightly as needed.  MELATONIN PO Take 3 Tabs by mouth nightly as needed (sleep aid).        Past Medical History:   Diagnosis Date    Hypercholesterolemia     Hypertension       Lab Results   Component Value Date/Time    WBC 8.7 12/05/2017 04:48 AM    HGB 12.9 12/05/2017 04:48 AM    HCT 38.8 12/05/2017 04:48 AM    PLATELET 409 28/05/4121 04:48 AM    MCV 90.0 12/05/2017 04:48 AM     Lab Results   Component Value Date/Time    Cholesterol, total 173 01/23/2018 10:59 AM    HDL Cholesterol 48 01/23/2018 10:59 AM    LDL, calculated 101 (H) 01/23/2018 10:59 AM    Triglyceride 119 01/23/2018 10:59 AM    CHOL/HDL Ratio 5.6 (H) 12/04/2017 01:31 PM     Lab Results   Component Value Date/Time    GFR est non-AA 76 01/23/2018 10:59 AM    GFR est AA 88 01/23/2018 10:59 AM    Creatinine 0.91 01/23/2018 10:59 AM    BUN 15 01/23/2018 10:59 AM    Sodium 141 01/23/2018 10:59 AM    Potassium 4.2 01/23/2018 10:59 AM    Chloride 103 01/23/2018 10:59 AM    CO2 23 01/23/2018 10:59 AM    Magnesium 2.0 12/05/2017 04:48 AM        Review of Systems   Constitutional: Negative for chills and fever. Respiratory: Negative for shortness of breath. Cardiovascular: Negative for chest pain. Gastrointestinal: Negative for nausea and vomiting. Genitourinary: Positive for dysuria. Negative for hematuria. Musculoskeletal: Positive for back pain and joint pain (L knee). Physical Exam   Constitutional: She is oriented to person, place, and time. She appears well-developed and well-nourished. No distress. HENT:   Head: Normocephalic and atraumatic. Mouth/Throat: Oropharynx is clear and moist. No oropharyngeal exudate. Eyes: Conjunctivae and EOM are normal. Right eye exhibits no discharge. Left eye exhibits no discharge. Neck: Normal range of motion. Neck supple. Cardiovascular: Normal rate, regular rhythm, normal heart sounds and intact distal pulses. Exam reveals no gallop and no friction rub. No murmur heard. Pulmonary/Chest: Effort normal and breath sounds normal. No respiratory distress. She has no wheezes. She has no rales. She exhibits no tenderness. Musculoskeletal: She exhibits no edema, tenderness or deformity. Lymphadenopathy:     She has no cervical adenopathy. Neurological: She is alert and oriented to person, place, and time.  Coordination normal.   Skin: Skin is warm and dry. No rash noted. She is not diaphoretic. No erythema. No pallor. Psychiatric: She has a normal mood and affect. Her behavior is normal.       ASSESSMENT and PLAN    ICD-10-CM ICD-9-CM    1. Dysuria      Will treat with cipro BID, check urine, repeat UA in 2 weeks to make sure resolved   R30.0 788.1 AMB POC URINALYSIS DIP STICK AUTO W/O MICRO      METABOLIC PANEL, BASIC      CBC W/O DIFF      TSH 3RD GENERATION      HEMOGLOBIN A1C WITH EAG   2. Anxiety      Much improved, quite happy with Lexapro 10mg and Buspar 10mg, is interested in increasing Buspar to BID dosing, ordered, change Klonopin to qhs, discussed not taking every day if possible   F41.9 300.00 clonazePAM (KLONOPIN) 0.5 mg tablet      METABOLIC PANEL, BASIC      CBC W/O DIFF      TSH 3RD GENERATION      HEMOGLOBIN A1C WITH EAG   3. Essential hypertension      Adequately on lisinopril-hctz, generally better than today but she forgot to take her medication this AM   G14 858.4 METABOLIC PANEL, BASIC      CBC W/O DIFF      TSH 3RD GENERATION      HEMOGLOBIN A1C WITH EAG   4. Pure hypercholesterolemia      Near goal on Lipitor 20mg, will have work on weight loss, discussed [employer] and work weight loss program, check lipids in winter, if not improved adjust dose   R06.92 801.1 METABOLIC PANEL, BASIC      CBC W/O DIFF      TSH 3RD GENERATION      HEMOGLOBIN A1C WITH EAG   5. Reactive depression      See above, much improved   L85.0 158.0 METABOLIC PANEL, BASIC      CBC W/O DIFF      TSH 3RD GENERATION      HEMOGLOBIN A1C WITH EAG   6. Obesity (BMI 30.0-34. 9)      Discussed [employer], also weight loss program at work she is considering joining   W73.1 305.26 METABOLIC PANEL, BASIC      CBC W/O DIFF      TSH 3RD GENERATION      HEMOGLOBIN A1C WITH EAG   7.  IFG (impaired fasting glucose)      Check A1c   Y18.12 016.20 METABOLIC PANEL, BASIC      CBC W/O DIFF      TSH 3RD GENERATION      HEMOGLOBIN A1C WITH EAG         Scribed by Tanja Jonas of [facility], as dictated by Dr. Morgan Yao. Current diagnosis and concerns discussed with pt at length. Pt understands risks and benefits or current treatment plan and medications, and accepts the treatment and medication with any possible risks. Pt asks appropriate questions, which were answered. Pt was instructed to call with any concerns or problems. This note will not be viewable in [address]

## 2018-08-08 LAB
BUN SERPL-MCNC: 14 MG/DL (ref 6–24)
BUN/CREAT SERPL: 14 (ref 9–23)
CALCIUM SERPL-MCNC: 9.1 MG/DL (ref 8.7–10.2)
CHLORIDE SERPL-SCNC: 103 MMOL/L (ref 96–106)
CO2 SERPL-SCNC: 24 MMOL/L (ref 20–29)
CREAT SERPL-MCNC: 1 MG/DL (ref 0.57–1)
ERYTHROCYTE [DISTWIDTH] IN BLOOD BY AUTOMATED COUNT: 12.8 % (ref 12.3–15.4)
EST. AVERAGE GLUCOSE BLD GHB EST-MCNC: 105 MG/DL
GLUCOSE SERPL-MCNC: 89 MG/DL (ref 65–99)
HBA1C MFR BLD: 5.3 % (ref 4.8–5.6)
HCT VFR BLD AUTO: 36.9 % (ref 34–46.6)
HGB BLD-MCNC: 12.2 G/DL (ref 11.1–15.9)
MCH RBC QN AUTO: 30 PG (ref 26.6–33)
MCHC RBC AUTO-ENTMCNC: 33.1 G/DL (ref 31.5–35.7)
MCV RBC AUTO: 91 FL (ref 79–97)
PLATELET # BLD AUTO: 279 X10E3/UL (ref 150–379)
POTASSIUM SERPL-SCNC: 4.4 MMOL/L (ref 3.5–5.2)
RBC # BLD AUTO: 4.07 X10E6/UL (ref 3.77–5.28)
SODIUM SERPL-SCNC: 143 MMOL/L (ref 134–144)
TSH SERPL DL<=0.005 MIU/L-ACNC: 1.33 UIU/ML (ref 0.45–4.5)
WBC # BLD AUTO: 7.1 X10E3/UL (ref 3.4–10.8)

## 2018-08-10 LAB — BACTERIA UR CULT: ABNORMAL

## 2018-08-13 ENCOUNTER — TELEPHONE (OUTPATIENT)
Dept: INTERNAL MEDICINE CLINIC | Age: 48
End: 2018-08-13

## 2018-08-13 RX ORDER — NITROFURANTOIN 25; 75 MG/1; MG/1
100 CAPSULE ORAL 2 TIMES DAILY
Qty: 10 CAP | Refills: 0 | Status: SHIPPED | OUTPATIENT
Start: 2018-08-13 | End: 2022-05-06 | Stop reason: ALTCHOICE

## 2018-08-13 NOTE — TELEPHONE ENCOUNTER
Pt called requesting a call back inquiring why a new medication were prescribed and what were found in pt urine. Pt best contact number is [phone].        Message received & copied from [facility]

## 2018-08-13 NOTE — PROGRESS NOTES
Called, spoke to pt. Two pt identifiers confirmed. Pt informed per Dr. Elsa Silveira start macrobid 100mg bid for 5 days. Pt verbalized understanding of information discussed w/ no further questions at this time.

## 2018-08-25 RX ORDER — BUSPIRONE HYDROCHLORIDE 10 MG/1
TABLET ORAL
Qty: 30 TAB | Refills: 3 | Status: SHIPPED | COMMUNITY
Start: 2018-08-25 | End: 2020-03-17

## 2018-12-03 DIAGNOSIS — F41.9 ANXIETY: ICD-10-CM

## 2018-12-03 RX ORDER — CLONAZEPAM 0.5 MG/1
TABLET ORAL
Qty: 30 TAB | Refills: 1 | Status: SHIPPED | OUTPATIENT
Start: 2018-12-03 | End: 2019-02-08 | Stop reason: SDUPTHER

## 2018-12-04 NOTE — TELEPHONE ENCOUNTER
Following rx was faxed to pharmacy with confirmation received:      clonazePAM (KLONOPIN) 0.5 mg tablet [[id number]]   Order Details   Dose, Route, Frequency: As Directed Note to Pharmacy:   Not to exceed 2 additional fills before 02/03/2019PT SAID [address] [facility].       Dispense Quantity: 30 Tab Refills: 1 Fills remaining: --           Sig: TAKE 1 TABLET BY MOUTH EVERYDAY AT BEDTIME

## 2019-01-21 ENCOUNTER — TELEPHONE (OUTPATIENT)
Dept: INTERNAL MEDICINE CLINIC | Age: 49
End: 2019-01-21

## 2019-01-21 DIAGNOSIS — I10 ESSENTIAL HYPERTENSION: Primary | ICD-10-CM

## 2019-01-21 DIAGNOSIS — R73.01 IFG (IMPAIRED FASTING GLUCOSE): ICD-10-CM

## 2019-01-21 DIAGNOSIS — E78.00 PURE HYPERCHOLESTEROLEMIA: ICD-10-CM

## 2019-01-21 NOTE — TELEPHONE ENCOUNTER
Called Pt, no answer, left message on voicemail. Left message to let Pt know labs were placed and callback if needed. Mailed orders out to Pt as well.

## 2019-01-21 NOTE — TELEPHONE ENCOUNTER
Patient states she needs a call back in reference to getting order put into the system so she can get her labs done prior to her appt on 2/8/19. Please call if any questions or to advise when done.  Thank you

## 2019-01-21 NOTE — TELEPHONE ENCOUNTER
Pt requesting a return call concerning lab orders         Message received & copied from [id number]

## 2019-01-21 NOTE — TELEPHONE ENCOUNTER
MD Praveen Garza, NARCISA   Caller: Unspecified (Today,  2:58 PM)             Lipids, cmp, Stuart Burrell, labs ordered.

## 2019-01-22 NOTE — TELEPHONE ENCOUNTER
Spoke with Pt  Two pt identifiers confirmed. Pt was wondering what labs were ordered and if it was ok to go get them done on the 2/1/19. Pt informed that day was ok and Dr. Tabatha Villgeas ordered, lipids,cmp, and a1c and that she needs to be fasting. Pt verbalized understanding of information discussed w/ no further questions at this time.

## 2019-02-08 DIAGNOSIS — F41.9 ANXIETY: ICD-10-CM

## 2019-02-08 RX ORDER — CLONAZEPAM 0.5 MG/1
TABLET ORAL
Qty: 30 TAB | Refills: 0 | Status: SHIPPED | OUTPATIENT
Start: 2019-02-08

## 2019-02-08 NOTE — TELEPHONE ENCOUNTER
Pt would like to speak with the nurse in reference to her refill for klonopin. Pt has an appt on 3/8 but would need this refilled before then.  Pt uses [facility] (information is on file) Pts contact [phone].         Message received & copied from [facility]

## 2019-02-11 ENCOUNTER — DOCUMENTATION ONLY (OUTPATIENT)
Dept: INTERNAL MEDICINE CLINIC | Age: 49
End: 2019-02-11

## 2020-03-17 ENCOUNTER — OFFICE VISIT (OUTPATIENT)
Dept: INTERNAL MEDICINE CLINIC | Age: 50
End: 2020-03-17

## 2020-03-17 VITALS
WEIGHT: 185.4 LBS | TEMPERATURE: 97.9 F | RESPIRATION RATE: 16 BRPM | OXYGEN SATURATION: 99 % | HEART RATE: 83 BPM | DIASTOLIC BLOOD PRESSURE: 92 MMHG | BODY MASS INDEX: 29.8 KG/M2 | HEIGHT: 66 IN | SYSTOLIC BLOOD PRESSURE: 141 MMHG

## 2020-03-17 DIAGNOSIS — E78.00 PURE HYPERCHOLESTEROLEMIA: ICD-10-CM

## 2020-03-17 DIAGNOSIS — N39.0 RECURRENT UTI: ICD-10-CM

## 2020-03-17 DIAGNOSIS — I10 ESSENTIAL HYPERTENSION: Primary | ICD-10-CM

## 2020-03-17 DIAGNOSIS — F41.9 ANXIETY AND DEPRESSION: ICD-10-CM

## 2020-03-17 DIAGNOSIS — Z00.00 PHYSICAL EXAM: ICD-10-CM

## 2020-03-17 DIAGNOSIS — F32.A ANXIETY AND DEPRESSION: ICD-10-CM

## 2020-03-17 RX ORDER — FLUOXETINE HYDROCHLORIDE 20 MG/1
CAPSULE ORAL
Qty: 90 CAP | Refills: 1 | Status: SHIPPED | OUTPATIENT
Start: 2020-03-17 | End: 2022-05-06 | Stop reason: SDUPTHER

## 2020-03-17 RX ORDER — LISINOPRIL AND HYDROCHLOROTHIAZIDE 12.5; 2 MG/1; MG/1
1 TABLET ORAL DAILY
Qty: 90 TAB | Refills: 1 | Status: SHIPPED | OUTPATIENT
Start: 2020-03-17 | End: 2020-09-03

## 2020-03-17 RX ORDER — BUPROPION HCL 300 MG
TABLET, EXTENDED RELEASE 24 HR ORAL
COMMUNITY
End: 2022-05-06 | Stop reason: SDUPTHER

## 2020-03-17 RX ORDER — FLUOXETINE HYDROCHLORIDE 20 MG/1
CAPSULE ORAL
COMMUNITY
Start: 2020-02-05 | End: 2020-03-17 | Stop reason: SDUPTHER

## 2020-03-17 NOTE — PROGRESS NOTES
Last here 8/7/18 Pt is here for routine care. /cpe      BP is elevated  Wt is up 185 lbs   Pt is working on this,states her wt increased to 200 lbs   She is trying to work on wt loss and idet       Reviewed labs 8/18   Will get labs today              PREVENTIVE:  Colonoscopy: not yet needed, denies FMHX  Pap: Dr. Silvana So, 11/15/19   Mammogram: due, reminded pt to schedule   Dexa: not yet needed  Tdap: 2013  Pneumovax: not yet needed  Shingrix: not yet needed  Flu shot: did not get this year   Eye exam: 2017 at [facility]--due  A1C: 12/17 5.6 8/18 5.3   Lipids: 1/18      Patient Active Problem List    Diagnosis Date Noted    Hypertension 12/04/2017    Hyperlipidemia 12/04/2017    Paresthesia 12/04/2017    Transient ischemic attack involving right internal carotid artery 12/04/2017    Bilateral carotid artery stenosis 12/04/2017    Nonintractable epilepsy with complex partial seizures ([facility] [address] 12/04/2017    Altered mental status, unspecified 12/04/2017    Left sided numbness 12/04/2017    Cervical post-laminectomy syndrome 12/04/2017     Current Outpatient Medications   Medication Sig Dispense Refill    clonazePAM (KLONOPIN) 0.5 mg tablet TAKE 1 TABLET BY MOUTH EVERYDAY AT BEDTIME 30 Tab 0    busPIRone (BUSPAR) 10 mg tablet TAKE 1 TABLET AT BEDTIME 30 Tab 3    nitrofurantoin, macrocrystal-monohydrate, (MACROBID) 100 mg capsule Take 1 Cap by mouth two (2) times a day. 10 Cap 0    lisinopril-hydroCHLOROthiazide (PRINZIDE, ZESTORETIC) 20-12.5 mg per tablet Take 1 Tab by mouth daily. 90 Tab 1    atorvastatin (LIPITOR) 20 mg tablet Take 1 Tab by mouth daily. 90 Tab 1    escitalopram oxalate (LEXAPRO) 10 mg tablet Take 1 Tab by mouth daily. 90 Tab 1    busPIRone (BUSPAR) 10 mg tablet Take 1 Tab by mouth two (2) times a day. 180 Tab 1    butalbital-acetaminophen-caffeine (FIORICET, ESGIC) -40 mg per tablet Take 1 Tab by mouth every six (6) hours as needed for Headache. Max Daily Amount: 4 Tabs. 20 Tab 0    Ascorbic Acid-Multivits-Min (EMERGEN-C) 1,000 mg pwep Take 1 Packet by mouth nightly as needed.  MELATONIN PO Take 3 Tabs by mouth nightly as needed (sleep aid). Past Surgical History:   Procedure Laterality Date    HX CHOLECYSTECTOMY      HX ORTHOPAEDIC      herniated disc     HX UROLOGICAL      URETHRAL REPAIR      Lab Results   Component Value Date/Time    WBC 7.1 08/07/2018 03:34 PM    HGB 12.2 08/07/2018 03:34 PM    HCT 36.9 08/07/2018 03:34 PM    PLATELET 333 15/70/0058 03:34 PM    MCV 91 08/07/2018 03:34 PM     Lab Results   Component Value Date/Time    Cholesterol, total 173 01/23/2018 10:59 AM    HDL Cholesterol 48 01/23/2018 10:59 AM    LDL, calculated 101 (H) 01/23/2018 10:59 AM    Triglyceride 119 01/23/2018 10:59 AM    CHOL/HDL Ratio 5.6 (H) 12/04/2017 01:31 PM     Lab Results   Component Value Date/Time    GFR est non-AA 68 08/07/2018 03:34 PM    GFR est AA 79 08/07/2018 03:34 PM    Creatinine 1.00 08/07/2018 03:34 PM    BUN 14 08/07/2018 03:34 PM    Sodium 143 08/07/2018 03:34 PM    Potassium 4.4 08/07/2018 03:34 PM    Chloride 103 08/07/2018 03:34 PM    CO2 24 08/07/2018 03:34 PM    Magnesium 2.0 12/05/2017 04:48 AM        Review of Systems   Respiratory: Negative for shortness of breath. Cardiovascular: Negative for chest pain. Physical Exam  Constitutional:       General: She is not in acute distress. Appearance: She is well-developed. She is not diaphoretic. HENT:      Head: Normocephalic and atraumatic. Right Ear: Tympanic membrane, ear canal and external ear normal.      Left Ear: Tympanic membrane, ear canal and external ear normal.      Mouth/Throat:      Mouth: Mucous membranes are moist.      Pharynx: Oropharynx is clear. No oropharyngeal exudate or posterior oropharyngeal erythema. Eyes:      General: No scleral icterus. Right eye: No discharge. Left eye: No discharge.       Conjunctiva/sclera: Conjunctivae normal.      Pupils: Pupils are equal, round, and reactive to light. Neck:      Musculoskeletal: Normal range of motion and neck supple. Vascular: No carotid bruit. Cardiovascular:      Rate and Rhythm: Normal rate and regular rhythm. Heart sounds: Normal heart sounds. No murmur. No friction rub. No gallop. Pulmonary:      Effort: Pulmonary effort is normal. No respiratory distress. Breath sounds: Normal breath sounds. No wheezing or rales. Chest:      Chest wall: No tenderness. Abdominal:      General: There is no distension. Palpations: Abdomen is soft. There is no mass. Tenderness: There is no abdominal tenderness. There is no guarding or rebound. Hernia: No hernia is present. Musculoskeletal: Normal range of motion. General: No tenderness or deformity. Lymphadenopathy:      Cervical: No cervical adenopathy. Skin:     General: Skin is warm and dry. Coloration: Skin is not pale. Findings: No erythema or rash. Neurological:      Mental Status: She is alert and oriented to person, place, and time. Coordination: Coordination normal.   Psychiatric:         Behavior: Behavior normal.         ASSESSMENT and PLAN    ICD-10-CM ICD-9-CM    1. I10 401.9 LIPID PANEL      METABOLIC PANEL, COMPREHENSIVE      CBC W/O DIFF      TSH 3RD GENERATION      HEMOGLOBIN A1C WITH EAG   2.  F41.9 300.00 LIPID PANEL    Y65.3 492 METABOLIC PANEL, COMPREHENSIVE      CBC W/O DIFF      TSH 3RD GENERATION      HEMOGLOBIN A1C WITH EAG   3.  Physical exam                      Discussed diet wt loss too late for flu this season mammo due she will schedule pelvic utd labs ordered colo at age 50 eye exam due tdap utd  Z00.00 V70.9 LIPID PANEL      METABOLIC PANEL, COMPREHENSIVE      CBC W/O DIFF      TSH 3RD GENERATION      HEMOGLOBIN A1C WITH EAG     E78.00 272.0 LIPID PANEL      METABOLIC PANEL, COMPREHENSIVE      CBC W/O DIFF      TSH 3RD GENERATION      HEMOGLOBIN A1C WITH EAG     N39.0 599.0 Scribed by Francisco Harris of [address] [place] [address] [address], as dictated by Dr. Itzel Heart. Current diagnosis and concerns discussed with pt at length. Pt understands risks and benefits or current treatment plan and medications, and accepts the treatment and medication with any possible risks. Pt asks appropriate questions, which were answered. Pt was instructed to call with any concerns or problems. I have reviewed the note documented by the scribe. The services provided are my own.   The documentation is accurate

## 2020-03-17 NOTE — PROGRESS NOTES
HISTORY OF PRESENT ILLNESS  Cristal Gurrola is a 54 y.o. female. HPI    Last here 8/7/18 Pt is here for routine care.       BP is controlled   BPs at home are averaging 120/80s, sometimes 145/100   Has been off of lisinopril-HCTZ 20-12.5mg daily as she is overdue for f/u   Will restart this today   Complains of worsening swelling in legs today   Discussed hctz will help with this       Wt is up 185 lbs   Pt is working on this,states her wt increased to 200 lbs   She is trying to work on wt loss and idet       Reviewed labs 8/18   Will get labs today         She also complains of lateral L knee pain.   She plans on getting cortisone injections today        Pt's mom recently passed away and she lost her job   She is now on wellbutrin 300 mg per gyn   Pt met with a counselor who referred her to psych   She is now following with Dr Teodoro Grubbs (psych)   Continues on wellbutrin and prozac   Has Klonopin 0.5mg    She wonders if she can just follow with me   Discussed I cannot prescribe klonopin for her   Pt states she has not been using klonopin much         Not taking her Lipitor 20mg daily      Continues Vit D 1000U OTC daily     Pt follows with Dr. Garo Menjivar (neuro) for HAs  Last visit was 12/4/17  Takes Fioricet    Now on macrobid daily for recurrent UTI followin [facility] for this         PREVENTIVE:  Colonoscopy: not yet needed, denies [facility]  Pap: Dr. Millie Perez, 11/15/19   Mammogram: due, reminded pt to schedule   Dexa: not yet needed  Tdap: 2013  Pneumovax: not yet needed  Shingrix: not yet needed  Flu shot: did not get this year   Eye exam: 2017 at [facility]--due  A1C: 12/17 5.6 8/18 5.3   Lipids: 1/18      Patient Active Problem List    Diagnosis Date Noted    Hypertension 12/04/2017    Hyperlipidemia 12/04/2017    Paresthesia 12/04/2017    Transient ischemic attack involving right internal carotid artery 12/04/2017    Bilateral carotid artery stenosis 12/04/2017    Nonintractable epilepsy with complex partial seizures ([facility] [address] 12/04/2017    Altered mental status, unspecified 12/04/2017    Left sided numbness 12/04/2017    Cervical post-laminectomy syndrome 12/04/2017     Current Outpatient Medications   Medication Sig Dispense Refill    clonazePAM (KLONOPIN) 0.5 mg tablet TAKE 1 TABLET BY MOUTH EVERYDAY AT BEDTIME 30 Tab 0    busPIRone (BUSPAR) 10 mg tablet TAKE 1 TABLET AT BEDTIME 30 Tab 3    nitrofurantoin, macrocrystal-monohydrate, (MACROBID) 100 mg capsule Take 1 Cap by mouth two (2) times a day. 10 Cap 0    lisinopril-hydroCHLOROthiazide (PRINZIDE, ZESTORETIC) 20-12.5 mg per tablet Take 1 Tab by mouth daily. 90 Tab 1    atorvastatin (LIPITOR) 20 mg tablet Take 1 Tab by mouth daily. 90 Tab 1    escitalopram oxalate (LEXAPRO) 10 mg tablet Take 1 Tab by mouth daily. 90 Tab 1    busPIRone (BUSPAR) 10 mg tablet Take 1 Tab by mouth two (2) times a day. 180 Tab 1    butalbital-acetaminophen-caffeine (FIORICET, ESGIC) -40 mg per tablet Take 1 Tab by mouth every six (6) hours as needed for Headache. Max Daily Amount: 4 Tabs. 20 Tab 0    Ascorbic Acid-Multivits-Min (EMERGEN-C) 1,000 mg pwep Take 1 Packet by mouth nightly as needed.  MELATONIN PO Take 3 Tabs by mouth nightly as needed (sleep aid).        Past Surgical History:   Procedure Laterality Date    HX CHOLECYSTECTOMY      HX ORTHOPAEDIC      herniated disc     HX UROLOGICAL      URETHRAL REPAIR      Lab Results   Component Value Date/Time    WBC 7.1 08/07/2018 03:34 PM    HGB 12.2 08/07/2018 03:34 PM    HCT 36.9 08/07/2018 03:34 PM    PLATELET 467 89/24/4723 03:34 PM    MCV 91 08/07/2018 03:34 PM     Lab Results   Component Value Date/Time    Cholesterol, total 173 01/23/2018 10:59 AM    HDL Cholesterol 48 01/23/2018 10:59 AM    LDL, calculated 101 (H) 01/23/2018 10:59 AM    Triglyceride 119 01/23/2018 10:59 AM    CHOL/HDL Ratio 5.6 (H) 12/04/2017 01:31 PM     Lab Results   Component Value Date/Time    GFR est non-AA 68 08/07/2018 03:34 PM    GFR est AA 79 08/07/2018 03:34 PM    Creatinine 1.00 08/07/2018 03:34 PM    BUN 14 08/07/2018 03:34 PM    Sodium 143 08/07/2018 03:34 PM    Potassium 4.4 08/07/2018 03:34 PM    Chloride 103 08/07/2018 03:34 PM    CO2 24 08/07/2018 03:34 PM    Magnesium 2.0 12/05/2017 04:48 AM        Review of Systems   Respiratory: Negative for shortness of breath. Cardiovascular: Negative for chest pain. Physical Exam  Constitutional:       General: She is not in acute distress. Appearance: She is well-developed. She is not diaphoretic. HENT:      Head: Normocephalic and atraumatic. Right Ear: Tympanic membrane, ear canal and external ear normal.      Left Ear: Tympanic membrane, ear canal and external ear normal.      Mouth/Throat:      Mouth: Mucous membranes are moist.      Pharynx: Oropharynx is clear. No oropharyngeal exudate or posterior oropharyngeal erythema. Eyes:      General: No scleral icterus. Right eye: No discharge. Left eye: No discharge. Conjunctiva/sclera: Conjunctivae normal.      Pupils: Pupils are equal, round, and reactive to light. Neck:      Musculoskeletal: Normal range of motion and neck supple. Vascular: No carotid bruit. Cardiovascular:      Rate and Rhythm: Normal rate and regular rhythm. Heart sounds: Normal heart sounds. No murmur. No friction rub. No gallop. Pulmonary:      Effort: Pulmonary effort is normal. No respiratory distress. Breath sounds: Normal breath sounds. No wheezing or rales. Chest:      Chest wall: No tenderness. Abdominal:      General: There is no distension. Palpations: Abdomen is soft. There is no mass. Tenderness: There is no abdominal tenderness. There is no guarding or rebound. Hernia: No hernia is present. Musculoskeletal: Normal range of motion. General: No tenderness or deformity. Lymphadenopathy:      Cervical: No cervical adenopathy.    Skin:     General: Skin is warm and dry. Coloration: Skin is not pale. Findings: No erythema or rash. Neurological:      Mental Status: She is alert and oriented to person, place, and time. Coordination: Coordination normal.   Psychiatric:         Mood and Affect: Mood normal.         Behavior: Behavior normal.         ASSESSMENT and PLAN    ICD-10-CM ICD-9-CM    1. Essential hypertension                      Restart lisinopril hctz monitor bp at home  I10 401.9 LIPID PANEL      METABOLIC PANEL, COMPREHENSIVE      CBC W/O DIFF      TSH 3RD GENERATION      HEMOGLOBIN A1C WITH EAG   2. Anxiety and depression                  Now doing quite well on prozac 20 mg and wellbutrin 300 mg continue she has been seeing psych and has been using klonopin sporadically would like to get meds from me and states she no longer needs to use klonopin I will prescribe wellbutrin and prozac  F41.9 300.00 LIPID PANEL    B38.4 340 METABOLIC PANEL, COMPREHENSIVE      CBC W/O DIFF      TSH 3RD GENERATION      HEMOGLOBIN A1C WITH EAG     Z00.00 V70.9 LIPID PANEL      METABOLIC PANEL, COMPREHENSIVE      CBC W/O DIFF      TSH 3RD GENERATION      HEMOGLOBIN A1C WITH EAG   4. Pure hypercholesterolemia                  Controlled in the past on lipitor has been out of this for quite some time will check lipids and restart lipitor  E78.00 272.0 LIPID PANEL      METABOLIC PANEL, COMPREHENSIVE      CBC W/O DIFF      TSH 3RD GENERATION      HEMOGLOBIN A1C WITH EAG   5. Recurrent UTI                  Now on macrobid follows with uro  N39.0 599.0               Scribed by Searcy Angelucci of [address] [place] [address] [address], as dictated by Dr. Ole Tyler. Current diagnosis and concerns discussed with pt at length. Pt understands risks and benefits or current treatment plan and medications, and accepts the treatment and medication with any possible risks. Pt asks appropriate questions, which were answered. Pt was instructed to call with any concerns or problems.       I have reviewed the note documented by the scribe. The services provided are my own.   The documentation is accurate

## 2021-01-28 ENCOUNTER — HOSPITAL ENCOUNTER (EMERGENCY)
Age: 51
Discharge: HOME | End: 2021-01-28
Attending: EMERGENCY MEDICINE
Payer: MEDICAID

## 2021-01-28 ENCOUNTER — APPOINTMENT (OUTPATIENT)
Dept: CT IMAGING | Age: 51
End: 2021-01-28
Attending: STUDENT IN AN ORGANIZED HEALTH CARE EDUCATION/TRAINING PROGRAM
Payer: MEDICAID

## 2021-01-28 VITALS
BODY MASS INDEX: 32.96 KG/M2 | HEART RATE: 88 BPM | SYSTOLIC BLOOD PRESSURE: 157 MMHG | RESPIRATION RATE: 21 BRPM | OXYGEN SATURATION: 97 % | WEIGHT: 210 LBS | DIASTOLIC BLOOD PRESSURE: 124 MMHG | HEIGHT: 67 IN | TEMPERATURE: 98.2 F

## 2021-01-28 DIAGNOSIS — I10 HYPERTENSION, UNSPECIFIED TYPE: ICD-10-CM

## 2021-01-28 DIAGNOSIS — H81.10 BENIGN PAROXYSMAL POSITIONAL VERTIGO, UNSPECIFIED LATERALITY: Primary | ICD-10-CM

## 2021-01-28 LAB
ALBUMIN SERPL-MCNC: 4 G/DL (ref 3.5–5)
ALBUMIN/GLOB SERPL: 1.1 {RATIO} (ref 1.1–2.2)
ALP SERPL-CCNC: 69 U/L (ref 45–117)
ALT SERPL-CCNC: 29 U/L (ref 12–78)
ANION GAP SERPL CALC-SCNC: 7 MMOL/L (ref 5–15)
AST SERPL-CCNC: 23 U/L (ref 15–37)
ATRIAL RATE: 88 BPM
BASOPHILS # BLD: 0.1 K/UL (ref 0–0.1)
BASOPHILS NFR BLD: 1 % (ref 0–1)
BILIRUB SERPL-MCNC: 0.5 MG/DL (ref 0.2–1)
BUN SERPL-MCNC: 13 MG/DL (ref 6–20)
BUN/CREAT SERPL: 13 (ref 12–20)
CALCIUM SERPL-MCNC: 9 MG/DL (ref 8.5–10.1)
CALCULATED P AXIS, ECG09: 49 DEGREES
CALCULATED R AXIS, ECG10: -4 DEGREES
CALCULATED T AXIS, ECG11: 29 DEGREES
CHLORIDE SERPL-SCNC: 109 MMOL/L (ref 97–108)
CO2 SERPL-SCNC: 23 MMOL/L (ref 21–32)
CREAT SERPL-MCNC: 0.97 MG/DL (ref 0.55–1.02)
DIAGNOSIS, 93000: NORMAL
DIFFERENTIAL METHOD BLD: NORMAL
EOSINOPHIL # BLD: 0.2 K/UL (ref 0–0.4)
EOSINOPHIL NFR BLD: 3 % (ref 0–7)
ERYTHROCYTE [DISTWIDTH] IN BLOOD BY AUTOMATED COUNT: 12 % (ref 11.5–14.5)
GLOBULIN SER CALC-MCNC: 3.8 G/DL (ref 2–4)
GLUCOSE SERPL-MCNC: 109 MG/DL (ref 65–100)
HCT VFR BLD AUTO: 39.4 % (ref 35–47)
HGB BLD-MCNC: 12.8 G/DL (ref 11.5–16)
IMM GRANULOCYTES # BLD AUTO: 0 K/UL (ref 0–0.04)
IMM GRANULOCYTES NFR BLD AUTO: 0 % (ref 0–0.5)
LYMPHOCYTES # BLD: 1.7 K/UL (ref 0.8–3.5)
LYMPHOCYTES NFR BLD: 23 % (ref 12–49)
MCH RBC QN AUTO: 29.5 PG (ref 26–34)
MCHC RBC AUTO-ENTMCNC: 32.5 G/DL (ref 30–36.5)
MCV RBC AUTO: 90.8 FL (ref 80–99)
MONOCYTES # BLD: 0.6 K/UL (ref 0–1)
MONOCYTES NFR BLD: 8 % (ref 5–13)
NEUTS SEG # BLD: 4.8 K/UL (ref 1.8–8)
NEUTS SEG NFR BLD: 65 % (ref 32–75)
NRBC # BLD: 0 K/UL (ref 0–0.01)
NRBC BLD-RTO: 0 PER 100 WBC
P-R INTERVAL, ECG05: 140 MS
PLATELET # BLD AUTO: 250 K/UL (ref 150–400)
PMV BLD AUTO: 9.5 FL (ref 8.9–12.9)
POTASSIUM SERPL-SCNC: 3.9 MMOL/L (ref 3.5–5.1)
PROT SERPL-MCNC: 7.8 G/DL (ref 6.4–8.2)
Q-T INTERVAL, ECG07: 398 MS
QRS DURATION, ECG06: 86 MS
QTC CALCULATION (BEZET), ECG08: 481 MS
RBC # BLD AUTO: 4.34 M/UL (ref 3.8–5.2)
SODIUM SERPL-SCNC: 139 MMOL/L (ref 136–145)
TROPONIN I SERPL-MCNC: <0.05 NG/ML
VENTRICULAR RATE, ECG03: 88 BPM
WBC # BLD AUTO: 7.4 K/UL (ref 3.6–11)

## 2021-01-28 PROCEDURE — 80053 COMPREHEN METABOLIC PANEL: CPT

## 2021-01-28 PROCEDURE — 74011250636 HC RX REV CODE- 250/636: Performed by: STUDENT IN AN ORGANIZED HEALTH CARE EDUCATION/TRAINING PROGRAM

## 2021-01-28 PROCEDURE — 99285 EMERGENCY DEPT VISIT HI MDM: CPT

## 2021-01-28 PROCEDURE — 84484 ASSAY OF TROPONIN QUANT: CPT

## 2021-01-28 PROCEDURE — 96360 HYDRATION IV INFUSION INIT: CPT

## 2021-01-28 PROCEDURE — 36415 COLL VENOUS BLD VENIPUNCTURE: CPT

## 2021-01-28 PROCEDURE — 85025 COMPLETE CBC W/AUTO DIFF WBC: CPT

## 2021-01-28 PROCEDURE — 70450 CT HEAD/BRAIN W/O DYE: CPT

## 2021-01-28 PROCEDURE — 93005 ELECTROCARDIOGRAM TRACING: CPT

## 2021-01-28 RX ORDER — MECLIZINE HCL 12.5 MG 12.5 MG/1
25 TABLET ORAL
Status: COMPLETED | OUTPATIENT
Start: 2021-01-28 | End: 2021-01-28

## 2021-01-28 RX ORDER — MECLIZINE HYDROCHLORIDE 25 MG/1
25 TABLET ORAL
Qty: 20 TAB | Refills: 0 | Status: SHIPPED | OUTPATIENT
Start: 2021-01-28 | End: 2021-02-04

## 2021-01-28 RX ORDER — LISINOPRIL AND HYDROCHLOROTHIAZIDE 12.5; 2 MG/1; MG/1
1 TABLET ORAL DAILY
Qty: 14 TAB | Refills: 0 | Status: SHIPPED | OUTPATIENT
Start: 2021-01-28 | End: 2021-02-11

## 2021-01-28 RX ADMIN — SODIUM CHLORIDE 1000 ML: 9 INJECTION, SOLUTION INTRAVENOUS at 04:02

## 2021-01-28 RX ADMIN — MECLIZINE 25 MG: 12.5 TABLET ORAL at 04:00

## 2021-01-28 NOTE — ED PROVIDER NOTES
EMERGENCY DEPARTMENT HISTORY AND PHYSICAL EXAM          Date: 1/28/2021  Patient Name: Idania Carcamo  Attending of Record: Zbigniew Gonzalez    History of Presenting Illness     Chief Complaint   Patient presents with    Dizziness       History Provided By: Patient    HPI: Idania Carcamo is a 52 y.o. female with past medical history of HTN who presents to the emergency department with acute dizziness that she describes as the room spinning that began 1 hour prior to arrival.  She reports that she was getting up to go to the bathroom and all of a sudden the room started spinning around. She reported that the symptoms went away when she still, but if she changed positions that would come back. She did report some right hand tingling with her symptoms as well, but denied any other vision change, weakness, nausea, vomiting. She has not had any recent sickness and denies any fever. No family history of strokes. PCP: Julissa Stearns MD    There are no other complaints, changes, or physical findings at this time. Current Facility-Administered Medications   Medication Dose Route Frequency Provider Last Rate Last Admin    sodium chloride 0.9 % bolus infusion 1,000 mL  1,000 mL IntraVENous ONCE Bonnie Kruse MD 1,000 mL/hr at 01/28/21 0402 1,000 mL at 01/28/21 0402     Current Outpatient Medications   Medication Sig Dispense Refill    lisinopril-hydroCHLOROthiazide (PRINZIDE, ZESTORETIC) 20-12.5 mg per tablet TAKE 1 TABLET BY MOUTH EVERY DAY 5 Tab 0    buPROPion XL (Wellbutrin XL) 300 mg XL tablet       FLUoxetine (PROzac) 20 mg capsule TAKE 1 CAPSULE BY MOUTH EVERY DAY 90 Cap 1    clonazePAM (KLONOPIN) 0.5 mg tablet TAKE 1 TABLET BY MOUTH EVERYDAY AT BEDTIME 30 Tab 0    nitrofurantoin, macrocrystal-monohydrate, (MACROBID) 100 mg capsule Take 1 Cap by mouth two (2) times a day. (Patient taking differently: Take 50 mg by mouth daily. ) 10 Cap 0    atorvastatin (LIPITOR) 20 mg tablet Take 1 Tab by mouth daily. 90 Tab 1    butalbital-acetaminophen-caffeine (FIORICET, ESGIC) -40 mg per tablet Take 1 Tab by mouth every six (6) hours as needed for Headache. Max Daily Amount: 4 Tabs. 20 Tab 0    Ascorbic Acid-Multivits-Min (EMERGEN-C) 1,000 mg pwep Take 1 Packet by mouth nightly as needed.  MELATONIN PO Take 3 Tabs by mouth nightly as needed (sleep aid). Past History     Past Medical History:  Past Medical History:   Diagnosis Date    Hypercholesterolemia     Hypertension        Past Surgical History:  Past Surgical History:   Procedure Laterality Date    HX CHOLECYSTECTOMY      HX ORTHOPAEDIC      herniated disc     HX UROLOGICAL      URETHRAL REPAIR       Family History:  Family History   Problem Relation Age of Onset   [facility] Hypertension Mother     Stroke Mother     Hypertension Father     Dementia Father        Social History:  Social History     Tobacco Use    Smoking status: Never Smoker    Smokeless tobacco: Never Used   Substance Use Topics    Alcohol use: Yes     Alcohol/week: 2.5 standard drinks     Types: 3 Glasses of wine per week    Drug use: Not Currently       Allergies: Allergies   Allergen Reactions    Norvasc [Amlodipine] Swelling     Swelling to feet and ankles    Paxil [Paroxetine Hcl] Other (comments)     Serotonin SD         Review of Systems   Review of Systems   Constitutional: Negative for activity change, chills and fever. HENT: Negative for sore throat. Respiratory: Negative for shortness of breath. Cardiovascular: Negative for chest pain. Gastrointestinal: Negative for abdominal pain, nausea and vomiting. Genitourinary: Negative for difficulty urinating, dysuria and hematuria. Musculoskeletal: Negative for myalgias. Skin: Negative for color change. Neurological: Positive for dizziness. Negative for weakness and headaches. Right hand tingling   Psychiatric/Behavioral: Negative for agitation.       Physical Exam   Physical Exam  Constitutional:       Appearance: Normal appearance. HENT:      Head: Normocephalic and atraumatic. Mouth/Throat:      Mouth: Mucous membranes are moist.   Eyes:      Pupils: Pupils are equal, round, and reactive to light. Neck:      Musculoskeletal: Normal range of motion and neck supple. Cardiovascular:      Rate and Rhythm: Normal rate and regular rhythm. Heart sounds: Normal heart sounds. Pulmonary:      Effort: Pulmonary effort is normal.      Breath sounds: Normal breath sounds. Abdominal:      General: Abdomen is flat. Bowel sounds are normal.   Musculoskeletal: Normal range of motion. Skin:     General: Skin is warm and dry. Capillary Refill: Capillary refill takes less than 2 seconds. Neurological:      General: No focal deficit present. Mental Status: She is alert and oriented to person, place, and time. Cranial Nerves: No cranial nerve deficit. Sensory: No sensory deficit. Motor: No weakness. Coordination: Coordination normal.      Comments: Reproduction of symptoms with [place]-Hallpike maneuver       Diagnostic Study Results     Labs -   No results found for this or any previous visit (from the past 12 hour(s)). Radiologic Studies -   CT HEAD WO CONT    (Results Pending)     CT Results  (Last 48 hours)    None        CXR Results  (Last 48 hours)    None            Medical Decision Making   I am the first provider for this patient. I reviewed the vital signs, available nursing notes, past medical history, past surgical history, family history and social history. Vital Signs-Reviewed the patient's vital signs.   Patient Vitals for the past 12 hrs:   Temp Pulse Resp BP SpO2   01/28/21 0331 -- 88 18 (!) 159/99 98 %   01/28/21 0329 98.2 °F (36.8 °C) -- -- -- --       ECG Interpretation: Sinus rhythm, heart rate 88, leftward deviated axis, normal intervals other than slightly prolonged QTC at 481, flattened T waves in anterolateral leads    Records Reviewed: Nursing Notes and Old Medical Records    Provider Notes (Medical Decision Making):   DDx:   BPPV  ICH  Vertigo  Electrolyte abnormality  Anemia    Pt with acute onset of dizziness. With description of symptoms as room spinning and normal neuro exam other than positive [place]-Hallpike test I strongly suspect vertigo. Pt with only HTN - no other risk factors or strong family hx suggesting stroke. Doubt ACS or arrhythmia with no chest pain or palpitations but will check troponin and EKG. Will eval for electrolyte abnormality or anemia with CBC and BMP. CT head to r/o ICH. Will treat symptoms with IVF and meclizine. ED Course and Progress Notes:   Initial assessment performed. The patients presenting problems have been discussed, and they are in agreement with the care plan formulated and outlined with them. I have encouraged them to ask questions as they arise throughout their visit. ED Course as of Jan 28 0708   Thu Jan 28, 2021   3105 And imaging are unremarkable. Patient has received about 500 cc of fluids. Performed Epley maneuver, and patient states that her dizziness is persisting. Will allow pt to finish fluids, perform a roadtest and reassess    [WB]   0603 Patient ambulated with normal gait to the bathroom. Repeat neuro exam normal and her right arm tingling has resolved. Will discharge patient with prescription for meclizine. We will also give instructions on how to perform Epley maneuver at home on her own. Will provide follow-up information with ENT. We discussed customary return cautions and she voiced understanding.    [WB]      ED Course User Index  [WB] Nigel Jones MD       Diagnosis     Clinical Impression:   1. Benign paroxysmal positional vertigo, unspecified laterality    2. Hypertension, unspecified type        Disposition:  Home    DISCHARGE PLAN:   1.    Current Discharge Medication List      START taking these medications    Details   meclizine (ANTIVERT) 25 mg tablet Take 1 Tab by mouth three (3) times daily as needed for Dizziness for up to 7 days. Qty: 20 Tab, Refills: 0           2. Follow-up Information     Follow up With Specialties Details Why Contact Info    Umair Chiu MD Internal Medicine In 1 week  [address] [facility]  [address] [phone]      Genaro Nelson MD Otolaryngology Schedule an appointment as soon as possible for a visit   [facility] [phone]      [facility] EMERGENCY DEPT Emergency Medicine  As needed, If symptoms worsen [address]  [address] [facility] [address] [facility]  [phone]        3. Return to ED if worse         Resident Signature:  Jami Rosas MD, PGY4

## 2021-01-28 NOTE — Clinical Note
[address]  [facility] EMERGENCY DEPT  [address] [place] [id number]  [phone]    Work/School Note    Date: 1/28/2021    To Whom It May concern:    Tom Johnson was seen and treated today in the emergency room by the following provider(s):  Attending Provider: Nannette Edmondson MD  Resident: Rodney Sue MD.      Tom Johnson is excused from work/school on 1/28/2021 through 1/31/2021. She is medically clear to return to work/school on 2/1/2021.         Sincerely,          Linda Zurita MD

## 2021-01-28 NOTE — ED NOTES
Patient ambulated in room and to bathroom and back. Patient stated she felt dizzy but it was slowly going away when standing.  Tolerated walking to bathroom and back

## 2021-01-28 NOTE — ED NOTES
Assumed care of patient from EMS w c/o acute onset of dizziness around 0230. Patient now w complaints of R sided arm numbness. 0261- Bedside shift change report given to Claretha Mcardle, RN (oncoming nurse) by Sarabjit Henriquez RN (offgoing nurse). Report included the following information SBAR, Kardex and Recent Results.

## 2022-01-06 ENCOUNTER — PATIENT MESSAGE (OUTPATIENT)
Dept: INTERNAL MEDICINE CLINIC | Age: 52
End: 2022-01-06

## 2022-01-06 DIAGNOSIS — I10 PRIMARY HYPERTENSION: Primary | ICD-10-CM

## 2022-01-06 DIAGNOSIS — E78.00 PURE HYPERCHOLESTEROLEMIA: ICD-10-CM

## 2022-01-06 DIAGNOSIS — R73.01 IFG (IMPAIRED FASTING GLUCOSE): ICD-10-CM

## 2022-01-10 NOTE — TELEPHONE ENCOUNTER
From: Charmaine Ambrose  To: Ila Can MD  Sent: 1/6/2022 8:16 PM EST  Subject: Appointment Request    Appointment Request From: Charmaine Ambrose    With Provider: Ila Can MD [facility]    Preferred Date Range: 1/17/2022  2/11/2022    Preferred Times: Monday Morning, Tuesday Morning, Wednesday Morning, Thursday Morning, Friday Morning    Reason for visit: Request an Appointment    Comments:  Now have insurance-need a physical, blood work done and [facility] Utilities overdue

## 2022-02-14 ENCOUNTER — TELEPHONE (OUTPATIENT)
Dept: INTERNAL MEDICINE CLINIC | Age: 52
End: 2022-02-14

## 2022-02-14 NOTE — TELEPHONE ENCOUNTER
----- Message from Tory Cerna sent at 2/14/2022 11:25 AM EST -----  Subject: Message to Provider    QUESTIONS  Information for Provider? Patient is requesting an appointment to come in   and have her bloodwork done this week.  ---------------------------------------------------------------------------  --------------  CALL BACK INFO  What is the best way for the office to contact you? OK to leave message on   voicemail  Preferred Call Back Phone Number? [phone]  ---------------------------------------------------------------------------  --------------  SCRIPT ANSWERS  Relationship to Patient?  Self

## 2022-02-14 NOTE — TELEPHONE ENCOUNTER
Called, spoke with Pt. Two pt identifiers confirmed. Pt informed no appt needed to make an appt. Pt given lab hours for the lab in office. Pt verbalized understanding of information discussed w/ no further questions at this time.

## 2022-02-21 ENCOUNTER — TELEPHONE (OUTPATIENT)
Dept: INTERNAL MEDICINE CLINIC | Age: 52
End: 2022-02-21

## 2022-03-18 ENCOUNTER — TELEPHONE (OUTPATIENT)
Dept: INTERNAL MEDICINE CLINIC | Age: 52
End: 2022-03-18

## 2022-03-18 PROBLEM — G45.1 TRANSIENT ISCHEMIC ATTACK INVOLVING RIGHT INTERNAL CAROTID ARTERY: Status: ACTIVE | Noted: 2017-12-04

## 2022-03-18 PROBLEM — R41.82 ALTERED MENTAL STATUS, UNSPECIFIED: Status: ACTIVE | Noted: 2017-12-04

## 2022-03-18 NOTE — TELEPHONE ENCOUNTER
----- Message from Sharri Barrios sent at 3/18/2022 12:29 PM EDT -----  Subject: Appointment Request    Reason for Call: Routine Physical Exam    QUESTIONS  Type of Appointment? Established Patient  Reason for appointment request? No appointments available during search  Additional Information for Provider? RESCHEDULE  Adult Physical  Pt   would like to be scheduled after April 8th.   ---------------------------------------------------------------------------  --------------  Darrick SCHMID  What is the best way for the office to contact you? OK to leave message on   voicemail  Preferred Call Back Phone Number? [phone]  ---------------------------------------------------------------------------  --------------  SCRIPT ANSWERS  Relationship to Patient? Self  (If the patient has Medicare as their primary insurance coverage ask this   question) Are you requesting a Medicare Annual Wellness Visit? No  (Is the patient requesting a pap smear with their physical exam?)? No  (Is the patient requesting their annual physical and does not need PAP or   AWV per above?)? Yes   Have you been diagnosed with, awaiting test results for, or told that you   are suspected of having COVID-19 (Coronavirus)? (If patient has tested   negative or was tested as a requirement for work, school, or travel and   not based on symptoms, answer no)? No  Within the past 10 days have you developed any of the following symptoms   (answer no if symptoms have been present longer than 10 days or began   more than 10 days ago)? Fever or Chills, Cough, Shortness of breath or   difficulty breathing, Loss of taste or smell, Sore throat, Nasal   congestion, Sneezing or runny nose, Fatigue or generalized body aches   (answer no if pain is specific to a body part e.g. back pain), Diarrhea,   Headache? No  Have you had close contact with someone with COVID-19 in the last 7 days?    No  (Service Expert  click yes below to proceed with [employer] As Usual Scheduling)?  Yes

## 2022-03-19 PROBLEM — G40.209 NONINTRACTABLE EPILEPSY WITH COMPLEX PARTIAL SEIZURES (HCC): Status: ACTIVE | Noted: 2017-12-04

## 2022-03-19 PROBLEM — I65.23 BILATERAL CAROTID ARTERY STENOSIS: Status: ACTIVE | Noted: 2017-12-04

## 2022-03-19 PROBLEM — E78.5 HYPERLIPIDEMIA: Status: ACTIVE | Noted: 2017-12-04

## 2022-03-19 PROBLEM — I10 HYPERTENSION: Status: ACTIVE | Noted: 2017-12-04

## 2022-03-19 PROBLEM — M96.1 CERVICAL POST-LAMINECTOMY SYNDROME: Status: ACTIVE | Noted: 2017-12-04

## 2022-03-19 PROBLEM — R20.0 LEFT SIDED NUMBNESS: Status: ACTIVE | Noted: 2017-12-04

## 2022-03-20 PROBLEM — R20.2 PARESTHESIA: Status: ACTIVE | Noted: 2017-12-04

## 2022-05-04 NOTE — PROGRESS NOTES
HISTORY OF PRESENT ILLNESS  Tracee Artis is a 54 y.o. female. HPI     Last here 3/17/20. Pt is here for routine care.    This is an established visit completed with telemedicine was completed with video assist  the patient acknowledges and agrees to this method of visitation wicho    She has not had covid vaccine, discussed that this would be a good idea  Declines for now does not feel comfortable with the vaccine    She lost her job during covid and did not have insurance, was helping take care of her father who has Alzheimer's  Her father is now in [place] with her brother  She has been out of all of her meds for over a year, behind on her own health maintenance  She will now be working for [employer]    She notes that she will have to fly for new job  States that she has flight anxiety, wonders if there is anything she can take for this, discussed can take ativan potentially    She was in the ED 1/28/21 for dizziness vertigo  Reviewed CT head 1/28/21:  No acute process.     Has a history of hypertension she ran out of all of her medications 2 years ago  BP at home 174/107  Pulse ox 87%-98%  Has been off of lisinopril-HCTZ 20-12.5mg daily  Will restart this       Wt today is 213 lbs, up 28 lbs x lov  Discussed wt loss and idet   Discussed portions Wellbutrin will help curb appetite      Reviewed labs  Ordered labs    She has a history of depression and anxiety  Needs referral to new psych she was seeing  John jones in the past who is now left  Was on wellbutrin and prozac   Had Klonopin 0.5mg --in the past  Has been out of meds for over a year  Will restart wellbutrin and prozac, discussed klonopin would come form psych    Has a history of hyperlipidemia  Not taking her Lipitor 20mg daily      Continues Vit D 1000U OTC daily     Pt follows with Dr. Tom Merla Severance (neuro) for HAs had history of paresthesias and twitching with an admission in 2017  Last visit was 12/4/17  Takes Fioricet  No issues recently     Previously on macrobid daily for recurrent UTI following with urology for this   Has not had to go recently no current issues     PREVENTIVE:  Colonoscopy: This is due ordered  Pap: Dr. Soniya Weaver, 11/15/19   Mammogram: due, reminded pt to schedule   Dexa: not yet needed  Tdap: 2013  Pneumovax: not yet needed  Shingrix: not yet needed  Flu shot: 10/21  Eye exam: 2017 at [facility]--due  A1C: 12/17 5.6 8/18 5.3   Lipids: 1/18   Covid: declines    Patient Active Problem List    Diagnosis Date Noted    Migraine without aura and without status migrainosus, not intractable 05/06/2022    Hypertension 12/04/2017    Hyperlipidemia 12/04/2017    Transient ischemic attack involving right internal carotid artery 12/04/2017    Bilateral carotid artery stenosis 12/04/2017    Nonintractable epilepsy with complex partial seizures ([facility] [address] 12/04/2017    Cervical post-laminectomy syndrome 12/04/2017     Current Outpatient Medications   Medication Sig Dispense Refill    atorvastatin (Lipitor) 20 mg tablet Take 1 Tablet by mouth daily. 90 Tablet 1    lisinopril-hydroCHLOROthiazide (PRINZIDE, ZESTORETIC) 20-12.5 mg per tablet Take 1 Tablet by mouth daily. 90 Tablet 1    FLUoxetine (PROzac) 20 mg capsule TAKE 1 CAPSULE BY MOUTH EVERY DAY 90 Capsule 1    buPROPion XL (Wellbutrin XL) 300 mg XL tablet 90 90 Tablet 1    clonazePAM (KLONOPIN) 0.5 mg tablet TAKE 1 TABLET BY MOUTH EVERYDAY AT BEDTIME (Patient not taking: Reported on 5/6/2022) 30 Tab 0    butalbital-acetaminophen-caffeine (FIORICET, ESGIC) -40 mg per tablet Take 1 Tab by mouth every six (6) hours as needed for Headache. Max Daily Amount: 4 Tabs. 20 Tab 0    Ascorbic Acid-Multivits-Min (EMERGEN-C) 1,000 mg pwep Take 1 Packet by mouth nightly as needed.  MELATONIN PO Take 3 Tabs by mouth nightly as needed (sleep aid).        Past Surgical History:   Procedure Laterality Date    HX CHOLECYSTECTOMY      HX ORTHOPAEDIC      herniated disc     HX UROLOGICAL URETHRAL REPAIR      Lab Results   Component Value Date/Time    WBC 7.4 01/28/2021 04:37 AM    HGB 12.8 01/28/2021 04:37 AM    HCT 39.4 01/28/2021 04:37 AM    PLATELET 515 52/51/1192 04:37 AM    MCV 90.8 01/28/2021 04:37 AM     Lab Results   Component Value Date/Time    Cholesterol, total 173 01/23/2018 10:59 AM    HDL Cholesterol 48 01/23/2018 10:59 AM    LDL, calculated 101 (H) 01/23/2018 10:59 AM    Triglyceride 119 01/23/2018 10:59 AM    CHOL/HDL Ratio 5.6 (H) 12/04/2017 01:31 PM     No results found for: PSA, Pe Ell Herb, [id number], [id number]     Review of Systems   Respiratory: Negative for shortness of breath. Cardiovascular: Negative for chest pain. Psychiatric/Behavioral: Positive for depression. Physical Exam  Constitutional:       General: She is not in acute distress. Appearance: Normal appearance. She is not ill-appearing, toxic-appearing or diaphoretic. HENT:      Head: Normocephalic and atraumatic. Eyes:      General:         Right eye: No discharge. Left eye: No discharge. Conjunctiva/sclera: Conjunctivae normal.   Pulmonary:      Effort: No respiratory distress. Neurological:      Mental Status: She is alert and oriented to person, place, and time. Mental status is at baseline. Gait: Gait normal.   Psychiatric:         Mood and Affect: Mood normal.         Behavior: Behavior normal.         ASSESSMENT and PLAN    ICD-10-CM ICD-9-CM    1. Primary hypertension    Restart lisinopril hydrochlorothiazide    Monitor blood pressure    Weight has climbed since last visit she may need additional medication as her readings are quite high will further assess blood pressure at her follow-up next month   I10 401.9    2. Pure hypercholesterolemia    Will restart Lipitor daily check lipids in 6 weeks E78.00 272.0    3.  Partial symptomatic epilepsy with complex partial seizures, not intractable, without status epilepticus ([facility] [address]    Ultimately no epilepsy was evaluated by neurology and noted admission for this back in 2017 recurrent symptoms     G40.209 345.40    4. Anxiety and depression  F41.9 300.00        Long history of recurrent anxiety and depression    Needs to establish with a new psychiatrist    We will restart her Wellbutrin and Prozac    Will send list of psychiatrist her placed referral F32. A 311    5 physical exam--discussed diet and weight loss overdue for mammogram pelvic exam colonoscopy eye exam and blood work all have been ordered      Depression screen reviewed and positive (6)  Will restart wellbutrin and prozac    Scribed by Ayana Hugo, as dictated by Dr. Vannesa Ramon. Current diagnosis and concerns discussed with pt at length. Pt understands risks and benefits or current treatment plan and medications, and accepts the treatment and medication with any possible risks. Pt asks appropriate questions, which were answered. Pt was instructed to call with any concerns or problems. I have reviewed the note documented by the scribe. The services provided are my own. The documentation is accurate     Merline Cockayne, was evaluated through a synchronous (real-time) audio-video encounter. The patient (or guardian if applicable) is aware that this is a billable service. Verbal consent to proceed has been obtained. The visit was conducted pursuant to the emergency declaration under the [facility], [address] [place] [address] [facility] authority and the [employer] General Act. Patient identification was verified, and a caregiver was present when appropriate. The patient was located at home in a state where the provider was licensed to provide care.

## 2022-05-06 ENCOUNTER — VIRTUAL VISIT (OUTPATIENT)
Dept: INTERNAL MEDICINE CLINIC | Age: 52
End: 2022-05-06
Payer: MEDICAID

## 2022-05-06 DIAGNOSIS — G43.009 MIGRAINE WITHOUT AURA AND WITHOUT STATUS MIGRAINOSUS, NOT INTRACTABLE: ICD-10-CM

## 2022-05-06 DIAGNOSIS — E66.9 OBESITY (BMI 30-39.9): ICD-10-CM

## 2022-05-06 DIAGNOSIS — Z00.00 PHYSICAL EXAM: ICD-10-CM

## 2022-05-06 DIAGNOSIS — N39.0 RECURRENT UTI: ICD-10-CM

## 2022-05-06 DIAGNOSIS — R73.01 IFG (IMPAIRED FASTING GLUCOSE): ICD-10-CM

## 2022-05-06 DIAGNOSIS — F41.9 ANXIETY AND DEPRESSION: ICD-10-CM

## 2022-05-06 DIAGNOSIS — F32.A ANXIETY AND DEPRESSION: ICD-10-CM

## 2022-05-06 DIAGNOSIS — I10 PRIMARY HYPERTENSION: Primary | ICD-10-CM

## 2022-05-06 DIAGNOSIS — G40.209 PARTIAL SYMPTOMATIC EPILEPSY WITH COMPLEX PARTIAL SEIZURES, NOT INTRACTABLE, WITHOUT STATUS EPILEPTICUS (HCC): ICD-10-CM

## 2022-05-06 DIAGNOSIS — E55.9 VITAMIN D DEFICIENCY: ICD-10-CM

## 2022-05-06 DIAGNOSIS — E78.00 PURE HYPERCHOLESTEROLEMIA: ICD-10-CM

## 2022-05-06 PROBLEM — R20.0 LEFT SIDED NUMBNESS: Status: RESOLVED | Noted: 2017-12-04 | Resolved: 2022-05-06

## 2022-05-06 PROBLEM — R20.2 PARESTHESIA: Status: RESOLVED | Noted: 2017-12-04 | Resolved: 2022-05-06

## 2022-05-06 PROBLEM — R41.82 ALTERED MENTAL STATUS, UNSPECIFIED: Status: RESOLVED | Noted: 2017-12-04 | Resolved: 2022-05-06

## 2022-05-06 PROCEDURE — 99214 OFFICE O/P EST MOD 30 MIN: CPT | Performed by: INTERNAL MEDICINE

## 2022-05-06 RX ORDER — BUPROPION HYDROCHLORIDE 300 MG/1
TABLET ORAL
Qty: 90 TABLET | Refills: 1 | Status: SHIPPED | OUTPATIENT
Start: 2022-05-06

## 2022-05-06 RX ORDER — FLUOXETINE HYDROCHLORIDE 20 MG/1
CAPSULE ORAL
Qty: 90 CAPSULE | Refills: 1 | Status: SHIPPED | OUTPATIENT
Start: 2022-05-06 | End: 2022-10-30

## 2022-05-06 RX ORDER — ATORVASTATIN CALCIUM 20 MG/1
20 TABLET, FILM COATED ORAL DAILY
Qty: 90 TABLET | Refills: 1 | Status: SHIPPED | OUTPATIENT
Start: 2022-05-06 | End: 2022-10-30

## 2022-05-06 RX ORDER — LISINOPRIL AND HYDROCHLOROTHIAZIDE 12.5; 2 MG/1; MG/1
1 TABLET ORAL DAILY
Qty: 90 TABLET | Refills: 1 | Status: SHIPPED | OUTPATIENT
Start: 2022-05-06 | End: 2022-10-30

## 2022-05-09 ENCOUNTER — TELEPHONE (OUTPATIENT)
Dept: INTERNAL MEDICINE CLINIC | Age: 52
End: 2022-05-09

## 2022-05-09 NOTE — TELEPHONE ENCOUNTER
Called, spoke to pt  Received two pt identifiers  Pt informed called pharmacy and updated rx directons. Pt verbalizes understanding of the instructions and has no further questions at this time.

## 2022-05-09 NOTE — TELEPHONE ENCOUNTER
Pt states that [facility] can not fill the bupropion  XL as they need directions. No directions went with refill. Pt needs this medication. Please call to let her know when this has been taken care of so she can go . Thanks.

## 2022-05-09 NOTE — TELEPHONE ENCOUNTER
Called, Spoke with Concepcion(Pharm Tech)  Received two pt identifiers  Concepcion informed pts bupropion is supposed to be 1 tablet by mouth daily. Alda Vernoica verbalizes understanding of the instructions and has no further questions at this time.

## 2022-06-03 ENCOUNTER — TELEPHONE (OUTPATIENT)
Dept: INTERNAL MEDICINE CLINIC | Age: 52
End: 2022-06-03

## 2022-06-03 NOTE — TELEPHONE ENCOUNTER
Called and rescheduled patient's physical for the next available office visit on 8/9/22. Advised patient that I would add her to a cancellation list and contact her if I get any cancellations for a sooner appointment.

## 2022-06-03 NOTE — TELEPHONE ENCOUNTER
----- Message from "[employer] sent at 6/3/2022 11:40 AM EDT -----  Subject: Appointment Request    Reason for Call: Routine Physical Exam    QUESTIONS  Type of Appointment? Established Patient  Reason for appointment request? No appointments available during search  Additional Information for Provider? pt called to reschedule 6/7 appt due   to having to see ENT at the same time for something being stuck in her   throat. No appts were available between 6/8 and 9/1 and pt needs someone   to call back to reschedule.  ---------------------------------------------------------------------------  --------------  [address]  What is the best way for the office to contact you? OK to leave message on   voicemail  Preferred Call Back Phone Number? [phone]  ---------------------------------------------------------------------------  --------------  SCRIPT ANSWERS  Relationship to Patient? Self  Have your symptoms changed? No  (If the patient has Medicare as their primary insurance coverage ask this   question) Are you requesting a Medicare Annual Wellness Visit? No  (Is the patient requesting a pap smear with their physical exam?)? No  (Is the patient requesting their annual physical and does not need PAP or   AWV per above?)? Yes   Have you been diagnosed with, awaiting test results for, or told that you   are suspected of having COVID-19 (Coronavirus)? (If patient has tested   negative or was tested as a requirement for work, school, or travel and   not based on symptoms, answer no)? No  Within the past 10 days have you developed any of the following symptoms   (answer no if symptoms have been present longer than 10 days or began   more than 10 days ago)?  Fever or Chills, Cough, Shortness of breath or   difficulty breathing, Loss of taste or smell, Sore throat, Nasal   congestion, Sneezing or runny nose, Fatigue or generalized body aches   (answer no if pain is specific to a body part e.g. back pain), Diarrhea, Headache? No  Have you had close contact with someone with COVID-19 in the last 7 days? No  (Service Expert  click yes below to proceed with [facility] As Usual   Scheduling)?  Yes

## 2022-09-28 NOTE — TELEPHONE ENCOUNTER
PCP: Nora Sanchez MD    Last appt: 1/23/2018  Future Appointments  Date Time Provider Sarah Ricardo   6/1/2018 1:00 PM Nora Sanchez MD [facility]       Requested Prescriptions     Pending Prescriptions Disp Refills    clonazePAM (KLONOPIN) 0.5 mg tablet 30 Tab 3     Sig: Take 1 Tab by mouth nightly as needed.  Max Daily Amount: 0.5 mg. 26.6

## 2023-05-15 ENCOUNTER — APPOINTMENT (OUTPATIENT)
Facility: HOSPITAL | Age: 53
DRG: 872 | End: 2023-05-15
Payer: COMMERCIAL

## 2023-05-15 ENCOUNTER — HOSPITAL ENCOUNTER (INPATIENT)
Facility: HOSPITAL | Age: 53
LOS: 3 days | Discharge: HOME | DRG: 872 | End: 2023-05-18
Attending: EMERGENCY MEDICINE | Admitting: STUDENT IN AN ORGANIZED HEALTH CARE EDUCATION/TRAINING PROGRAM
Payer: COMMERCIAL

## 2023-05-15 DIAGNOSIS — A41.9 SEPTICEMIA (HCC): Primary | ICD-10-CM

## 2023-05-15 DIAGNOSIS — N10 ACUTE PYELONEPHRITIS: ICD-10-CM

## 2023-05-15 DIAGNOSIS — N12 PYELONEPHRITIS: ICD-10-CM

## 2023-05-15 LAB
ALBUMIN SERPL-MCNC: 3.3 G/DL (ref 3.5–5)
ALBUMIN/GLOB SERPL: 0.7 (ref 1.1–2.2)
ALP SERPL-CCNC: 90 U/L (ref 45–117)
ALT SERPL-CCNC: 18 U/L (ref 12–78)
ANION GAP SERPL CALC-SCNC: 5 MMOL/L (ref 5–15)
APPEARANCE UR: ABNORMAL
AST SERPL-CCNC: 15 U/L (ref 15–37)
BACTERIA URNS QL MICRO: ABNORMAL /HPF
BASOPHILS # BLD: 0 K/UL (ref 0–0.1)
BASOPHILS NFR BLD: 0 % (ref 0–1)
BILIRUB SERPL-MCNC: 0.6 MG/DL (ref 0.2–1)
BILIRUB UR QL: NEGATIVE
BUN SERPL-MCNC: 15 MG/DL (ref 6–20)
BUN/CREAT SERPL: 17 (ref 12–20)
CALCIUM SERPL-MCNC: 9 MG/DL (ref 8.5–10.1)
CHLORIDE SERPL-SCNC: 102 MMOL/L (ref 97–108)
CO2 SERPL-SCNC: 29 MMOL/L (ref 21–32)
COLOR UR: ABNORMAL
CREAT SERPL-MCNC: 0.88 MG/DL (ref 0.55–1.02)
DIFFERENTIAL METHOD BLD: ABNORMAL
EOSINOPHIL # BLD: 0.1 K/UL (ref 0–0.4)
EOSINOPHIL NFR BLD: 1 % (ref 0–7)
EPITH CASTS URNS QL MICRO: ABNORMAL /LPF
ERYTHROCYTE [DISTWIDTH] IN BLOOD BY AUTOMATED COUNT: 13.1 % (ref 11.5–14.5)
GLOBULIN SER CALC-MCNC: 4.7 G/DL (ref 2–4)
GLUCOSE SERPL-MCNC: 140 MG/DL (ref 65–100)
GLUCOSE UR STRIP.AUTO-MCNC: NEGATIVE MG/DL
HCG SERPL QL: NEGATIVE
HCT VFR BLD AUTO: 41.2 % (ref 35–47)
HGB BLD-MCNC: 13.4 G/DL (ref 11.5–16)
HGB UR QL STRIP: ABNORMAL
HYALINE CASTS URNS QL MICRO: ABNORMAL /LPF (ref 0–5)
IMM GRANULOCYTES # BLD AUTO: 0 K/UL (ref 0–0.04)
IMM GRANULOCYTES NFR BLD AUTO: 0 % (ref 0–0.5)
KETONES UR QL STRIP.AUTO: NEGATIVE MG/DL
LACTATE BLD-SCNC: 1.35 MMOL/L (ref 0.4–2)
LEUKOCYTE ESTERASE UR QL STRIP.AUTO: ABNORMAL
LYMPHOCYTES # BLD: 1.5 K/UL (ref 0.8–3.5)
LYMPHOCYTES NFR BLD: 13 % (ref 12–49)
MCH RBC QN AUTO: 28.9 PG (ref 26–34)
MCHC RBC AUTO-ENTMCNC: 32.5 G/DL (ref 30–36.5)
MCV RBC AUTO: 88.8 FL (ref 80–99)
MONOCYTES # BLD: 1.1 K/UL (ref 0–1)
MONOCYTES NFR BLD: 9 % (ref 5–13)
NEUTS SEG # BLD: 8.8 K/UL (ref 1.8–8)
NEUTS SEG NFR BLD: 77 % (ref 32–75)
NITRITE UR QL STRIP.AUTO: POSITIVE
NRBC # BLD: 0 K/UL (ref 0–0.01)
NRBC BLD-RTO: 0 PER 100 WBC
PH UR STRIP: 6 (ref 5–8)
PLATELET # BLD AUTO: 288 K/UL (ref 150–400)
PMV BLD AUTO: 9.5 FL (ref 8.9–12.9)
POTASSIUM SERPL-SCNC: 4 MMOL/L (ref 3.5–5.1)
PROT SERPL-MCNC: 8 G/DL (ref 6.4–8.2)
PROT UR STRIP-MCNC: 100 MG/DL
RBC # BLD AUTO: 4.64 M/UL (ref 3.8–5.2)
RBC #/AREA URNS HPF: ABNORMAL /HPF (ref 0–5)
SODIUM SERPL-SCNC: 136 MMOL/L (ref 136–145)
SP GR UR REFRACTOMETRY: 1.02
URINE CULTURE IF INDICATED: ABNORMAL
UROBILINOGEN UR QL STRIP.AUTO: 1 EU/DL (ref 0.2–1)
WBC # BLD AUTO: 11.5 K/UL (ref 3.6–11)
WBC URNS QL MICRO: ABNORMAL /HPF (ref 0–4)

## 2023-05-15 PROCEDURE — 6370000000 HC RX 637 (ALT 250 FOR IP): Performed by: STUDENT IN AN ORGANIZED HEALTH CARE EDUCATION/TRAINING PROGRAM

## 2023-05-15 PROCEDURE — 6360000002 HC RX W HCPCS: Performed by: EMERGENCY MEDICINE

## 2023-05-15 PROCEDURE — 6360000002 HC RX W HCPCS: Performed by: STUDENT IN AN ORGANIZED HEALTH CARE EDUCATION/TRAINING PROGRAM

## 2023-05-15 PROCEDURE — 80053 COMPREHEN METABOLIC PANEL: CPT

## 2023-05-15 PROCEDURE — 74176 CT ABD & PELVIS W/O CONTRAST: CPT

## 2023-05-15 PROCEDURE — 87086 URINE CULTURE/COLONY COUNT: CPT

## 2023-05-15 PROCEDURE — 71045 X-RAY EXAM CHEST 1 VIEW: CPT

## 2023-05-15 PROCEDURE — 96375 TX/PRO/DX INJ NEW DRUG ADDON: CPT

## 2023-05-15 PROCEDURE — 2580000003 HC RX 258: Performed by: EMERGENCY MEDICINE

## 2023-05-15 PROCEDURE — 81001 URINALYSIS AUTO W/SCOPE: CPT

## 2023-05-15 PROCEDURE — 83605 ASSAY OF LACTIC ACID: CPT

## 2023-05-15 PROCEDURE — 94761 N-INVAS EAR/PLS OXIMETRY MLT: CPT

## 2023-05-15 PROCEDURE — 99285 EMERGENCY DEPT VISIT HI MDM: CPT

## 2023-05-15 PROCEDURE — 87186 SC STD MICRODIL/AGAR DIL: CPT

## 2023-05-15 PROCEDURE — 36415 COLL VENOUS BLD VENIPUNCTURE: CPT

## 2023-05-15 PROCEDURE — 2580000003 HC RX 258: Performed by: STUDENT IN AN ORGANIZED HEALTH CARE EDUCATION/TRAINING PROGRAM

## 2023-05-15 PROCEDURE — 87040 BLOOD CULTURE FOR BACTERIA: CPT

## 2023-05-15 PROCEDURE — 1100000000 HC RM PRIVATE

## 2023-05-15 PROCEDURE — 96365 THER/PROPH/DIAG IV INF INIT: CPT

## 2023-05-15 PROCEDURE — 87077 CULTURE AEROBIC IDENTIFY: CPT

## 2023-05-15 PROCEDURE — 84703 CHORIONIC GONADOTROPIN ASSAY: CPT

## 2023-05-15 PROCEDURE — 85025 COMPLETE CBC W/AUTO DIFF WBC: CPT

## 2023-05-15 PROCEDURE — 6370000000 HC RX 637 (ALT 250 FOR IP): Performed by: EMERGENCY MEDICINE

## 2023-05-15 PROCEDURE — 6370000000 HC RX 637 (ALT 250 FOR IP)

## 2023-05-15 PROCEDURE — 96374 THER/PROPH/DIAG INJ IV PUSH: CPT

## 2023-05-15 RX ORDER — ACETAMINOPHEN 500 MG
1000 TABLET ORAL
Status: COMPLETED | OUTPATIENT
Start: 2023-05-15 | End: 2023-05-15

## 2023-05-15 RX ORDER — SODIUM CHLORIDE 9 MG/ML
INJECTION, SOLUTION INTRAVENOUS CONTINUOUS
Status: ACTIVE | OUTPATIENT
Start: 2023-05-15 | End: 2023-05-15

## 2023-05-15 RX ORDER — KETOROLAC TROMETHAMINE 30 MG/ML
15 INJECTION, SOLUTION INTRAMUSCULAR; INTRAVENOUS EVERY 6 HOURS PRN
Status: DISCONTINUED | OUTPATIENT
Start: 2023-05-15 | End: 2023-05-18 | Stop reason: HOSPADM

## 2023-05-15 RX ORDER — SODIUM CHLORIDE 0.9 % (FLUSH) 0.9 %
5-40 SYRINGE (ML) INJECTION PRN
Status: DISCONTINUED | OUTPATIENT
Start: 2023-05-15 | End: 2023-05-18 | Stop reason: HOSPADM

## 2023-05-15 RX ORDER — KETOROLAC TROMETHAMINE 30 MG/ML
15 INJECTION, SOLUTION INTRAMUSCULAR; INTRAVENOUS
Status: COMPLETED | OUTPATIENT
Start: 2023-05-15 | End: 2023-05-15

## 2023-05-15 RX ORDER — SODIUM CHLORIDE 9 MG/ML
INJECTION, SOLUTION INTRAVENOUS PRN
Status: DISCONTINUED | OUTPATIENT
Start: 2023-05-15 | End: 2023-05-18 | Stop reason: HOSPADM

## 2023-05-15 RX ORDER — POLYETHYLENE GLYCOL 3350 17 G/17G
17 POWDER, FOR SOLUTION ORAL DAILY PRN
Status: DISCONTINUED | OUTPATIENT
Start: 2023-05-15 | End: 2023-05-18 | Stop reason: HOSPADM

## 2023-05-15 RX ORDER — LISINOPRIL AND HYDROCHLOROTHIAZIDE 20; 12.5 MG/1; MG/1
1 TABLET ORAL DAILY
Status: DISCONTINUED | OUTPATIENT
Start: 2023-05-15 | End: 2023-05-15

## 2023-05-15 RX ORDER — ENOXAPARIN SODIUM 100 MG/ML
40 INJECTION SUBCUTANEOUS DAILY
Status: DISCONTINUED | OUTPATIENT
Start: 2023-05-15 | End: 2023-05-18 | Stop reason: HOSPADM

## 2023-05-15 RX ORDER — ONDANSETRON 2 MG/ML
4 INJECTION INTRAMUSCULAR; INTRAVENOUS EVERY 6 HOURS PRN
Status: DISCONTINUED | OUTPATIENT
Start: 2023-05-15 | End: 2023-05-18 | Stop reason: HOSPADM

## 2023-05-15 RX ORDER — SODIUM CHLORIDE 0.9 % (FLUSH) 0.9 %
5-40 SYRINGE (ML) INJECTION EVERY 12 HOURS SCHEDULED
Status: DISCONTINUED | OUTPATIENT
Start: 2023-05-15 | End: 2023-05-18 | Stop reason: HOSPADM

## 2023-05-15 RX ORDER — LANOLIN ALCOHOL/MO/W.PET/CERES
3 CREAM (GRAM) TOPICAL NIGHTLY PRN
Status: DISCONTINUED | OUTPATIENT
Start: 2023-05-15 | End: 2023-05-18 | Stop reason: HOSPADM

## 2023-05-15 RX ORDER — ACETAMINOPHEN 325 MG/1
650 TABLET ORAL EVERY 6 HOURS PRN
Status: DISCONTINUED | OUTPATIENT
Start: 2023-05-15 | End: 2023-05-18 | Stop reason: HOSPADM

## 2023-05-15 RX ORDER — HYDROCHLOROTHIAZIDE 25 MG/1
12.5 TABLET ORAL DAILY
Status: DISCONTINUED | OUTPATIENT
Start: 2023-05-15 | End: 2023-05-18 | Stop reason: HOSPADM

## 2023-05-15 RX ORDER — LISINOPRIL 20 MG/1
20 TABLET ORAL DAILY
Status: DISCONTINUED | OUTPATIENT
Start: 2023-05-15 | End: 2023-05-18 | Stop reason: HOSPADM

## 2023-05-15 RX ORDER — ACETAMINOPHEN 650 MG/1
650 SUPPOSITORY RECTAL EVERY 6 HOURS PRN
Status: DISCONTINUED | OUTPATIENT
Start: 2023-05-15 | End: 2023-05-18 | Stop reason: HOSPADM

## 2023-05-15 RX ORDER — 0.9 % SODIUM CHLORIDE 0.9 %
1000 INTRAVENOUS SOLUTION INTRAVENOUS ONCE
Status: COMPLETED | OUTPATIENT
Start: 2023-05-15 | End: 2023-05-15

## 2023-05-15 RX ORDER — OXYCODONE HYDROCHLORIDE 5 MG/1
5 TABLET ORAL EVERY 4 HOURS PRN
Status: DISCONTINUED | OUTPATIENT
Start: 2023-05-15 | End: 2023-05-18 | Stop reason: HOSPADM

## 2023-05-15 RX ORDER — ONDANSETRON 2 MG/ML
4 INJECTION INTRAMUSCULAR; INTRAVENOUS
Status: COMPLETED | OUTPATIENT
Start: 2023-05-15 | End: 2023-05-15

## 2023-05-15 RX ORDER — ONDANSETRON 4 MG/1
4 TABLET, ORALLY DISINTEGRATING ORAL EVERY 8 HOURS PRN
Status: DISCONTINUED | OUTPATIENT
Start: 2023-05-15 | End: 2023-05-18 | Stop reason: HOSPADM

## 2023-05-15 RX ADMIN — MELATONIN 3 MG: at 23:36

## 2023-05-15 RX ADMIN — OXYCODONE 5 MG: 5 TABLET ORAL at 09:54

## 2023-05-15 RX ADMIN — ENOXAPARIN SODIUM 40 MG: 100 INJECTION SUBCUTANEOUS at 08:00

## 2023-05-15 RX ADMIN — KETOROLAC TROMETHAMINE 15 MG: 30 INJECTION, SOLUTION INTRAMUSCULAR; INTRAVENOUS at 06:44

## 2023-05-15 RX ADMIN — ONDANSETRON 4 MG: 4 TABLET, ORALLY DISINTEGRATING ORAL at 18:50

## 2023-05-15 RX ADMIN — ONDANSETRON 4 MG: 4 TABLET, ORALLY DISINTEGRATING ORAL at 09:29

## 2023-05-15 RX ADMIN — OXYCODONE 5 MG: 5 TABLET ORAL at 20:27

## 2023-05-15 RX ADMIN — LISINOPRIL 20 MG: 20 TABLET ORAL at 07:59

## 2023-05-15 RX ADMIN — KETOROLAC TROMETHAMINE 15 MG: 30 INJECTION, SOLUTION INTRAMUSCULAR; INTRAVENOUS at 18:50

## 2023-05-15 RX ADMIN — HYDROCHLOROTHIAZIDE 12.5 MG: 25 TABLET ORAL at 07:59

## 2023-05-15 RX ADMIN — ACETAMINOPHEN 650 MG: 325 TABLET ORAL at 23:33

## 2023-05-15 RX ADMIN — SODIUM CHLORIDE 1000 ML: 9 INJECTION, SOLUTION INTRAVENOUS at 02:57

## 2023-05-15 RX ADMIN — SODIUM CHLORIDE: 9 INJECTION, SOLUTION INTRAVENOUS at 06:38

## 2023-05-15 RX ADMIN — SODIUM CHLORIDE, PRESERVATIVE FREE 10 ML: 5 INJECTION INTRAVENOUS at 08:00

## 2023-05-15 RX ADMIN — OXYCODONE 5 MG: 5 TABLET ORAL at 14:11

## 2023-05-15 RX ADMIN — KETOROLAC TROMETHAMINE 15 MG: 30 INJECTION, SOLUTION INTRAMUSCULAR at 04:11

## 2023-05-15 RX ADMIN — SODIUM CHLORIDE 1000 MG: 900 INJECTION INTRAVENOUS at 07:59

## 2023-05-15 RX ADMIN — ACETAMINOPHEN 650 MG: 325 TABLET ORAL at 14:05

## 2023-05-15 RX ADMIN — ONDANSETRON 4 MG: 2 INJECTION INTRAMUSCULAR; INTRAVENOUS at 02:56

## 2023-05-15 RX ADMIN — SODIUM CHLORIDE 1000 MG: 900 INJECTION INTRAVENOUS at 02:57

## 2023-05-15 RX ADMIN — SODIUM CHLORIDE, PRESERVATIVE FREE 10 ML: 5 INJECTION INTRAVENOUS at 20:30

## 2023-05-15 RX ADMIN — ACETAMINOPHEN 1000 MG: 500 TABLET ORAL at 02:57

## 2023-05-15 ASSESSMENT — PAIN DESCRIPTION - ORIENTATION
ORIENTATION: LOWER;MID
ORIENTATION: LEFT;POSTERIOR
ORIENTATION: LOWER;LEFT
ORIENTATION: LEFT
ORIENTATION: LOWER;LEFT

## 2023-05-15 ASSESSMENT — PAIN SCALES - GENERAL
PAINLEVEL_OUTOF10: 5
PAINLEVEL_OUTOF10: 9
PAINLEVEL_OUTOF10: 7
PAINLEVEL_OUTOF10: 7
PAINLEVEL_OUTOF10: 6
PAINLEVEL_OUTOF10: 8
PAINLEVEL_OUTOF10: 5

## 2023-05-15 ASSESSMENT — PAIN - FUNCTIONAL ASSESSMENT
PAIN_FUNCTIONAL_ASSESSMENT: 0-10
PAIN_FUNCTIONAL_ASSESSMENT: ACTIVITIES ARE NOT PREVENTED
PAIN_FUNCTIONAL_ASSESSMENT: PREVENTS OR INTERFERES SOME ACTIVE ACTIVITIES AND ADLS

## 2023-05-15 ASSESSMENT — PAIN DESCRIPTION - DESCRIPTORS
DESCRIPTORS: ACHING
DESCRIPTORS: ACHING;CRAMPING
DESCRIPTORS: ACHING

## 2023-05-15 ASSESSMENT — PAIN DESCRIPTION - LOCATION
LOCATION: ABDOMEN;BACK
LOCATION: ABDOMEN
LOCATION: BACK
LOCATION: ABDOMEN;BACK
LOCATION: BACK

## 2023-05-15 ASSESSMENT — LIFESTYLE VARIABLES
HOW MANY STANDARD DRINKS CONTAINING ALCOHOL DO YOU HAVE ON A TYPICAL DAY: PATIENT DOES NOT DRINK
HOW OFTEN DO YOU HAVE A DRINK CONTAINING ALCOHOL: NEVER

## 2023-05-15 NOTE — ED NOTES
Called to give report to Argelia Shirley and was told she would call back after huddle. I give her the ext number.      Jose Carlos Lomas LPN  21/27/34 [id number]

## 2023-05-15 NOTE — ED PROVIDER NOTES
[facility]   [facility] Name: Ira Still  MRN: [id number]  [place] 1970  Date of evaluation: 5/15/2023  Provider: Wendi Brady MD   PCP: Giovanny Suresh MD  Note Started: 6:41 PM 5/15/23     CHIEF COMPLAINT       Chief Complaint   Patient presents with    Flank Pain     Patient ambulatory to triage w c/o L flank pain onset 3 days ago. Reports she cant take it anymore. HISTORY OF PRESENT ILLNESS: 1 or more elements      History From: Patient  None     Ira Still is a 50 y.o. female with history of hypertension, kidney stones, hyperlipidemia, who presents to ED with chief complaint of \"I think I have a kidney infection\". Patient reports for the past 3 days she has had a strong odor to her urine. She developed some left flank pain that is associated with nausea and vomiting and a fever to 103 at home. She has taken Tylenol and ibuprofen today but not in the last 6 hours. She has an IUD in place and does not know her last menstrual cycle. Reports history of kidney stones and has had lithotripsy in the past.  Denies any dysuria, hematuria, urinary frequency. States she has been feeling generally weak and ill.pain is localized in the left flank, but radiates to left lower quadrant. REVIEW OF SYSTEMS      Review of Systems     Positives and Pertinent negatives as per HPI.     PAST HISTORY     Past Medical History:  Past Medical History:   Diagnosis Date    Hypercholesterolemia     Hypertension          Past Surgical History:  Past Surgical History:   Procedure Laterality Date    CHOLECYSTECTOMY      ORTHOPEDIC SURGERY      herniated disc     UROLOGICAL SURGERY      URETHRAL REPAIR       Family History:  Family History   Problem Relation Age of Onset    Dementia Father     Stroke Mother     Hypertension Mother     Hypertension Father        Social History:  Social History     Tobacco Use    Smoking status: Never    Smokeless tobacco: Never

## 2023-05-15 NOTE — ED NOTES
Pt's IV line infiltrated. There were three more attempts to put in a new line, was not successful.        Jeremiah Wen LPN  73/75/17 [id number]

## 2023-05-15 NOTE — H&P
Hospitalist Admission Note    NAME:  Yael Craft   :  1970   MRN:  [id number]     Date/Time:  5/15/2023 5:20 AM    Patient PCP: Mary Kendrick MD    ______________________________________________________________________  Given the patient's current clinical presentation, I have a high level of concern for decompensation if discharged from the emergency department. Complex decision making was performed, which includes reviewing the patient's available past medical records, laboratory results, and x-ray films. My assessment of this patient's clinical condition and my plan of care is as follows. Assessment / Plan: Active Problems:  Severe sepsis without shock  Pyelonephritis with mild left hydronephrosis  Essential hypertension  Obesity class I by BMI 33.9  Elevated blood sugar without diagnosis of diabetes mellitus    Plan:  Severe sepsis without shock  Pyelonephritis with mild left hydronephrosis  Admit to telemetry monitoring  Continue empiric ceftriaxone  Follow urine culture and adjust antibiotics as indicated  Toradol as needed fever/pain  -Oxycodone as needed severe pain  Tylenol as needed fevers  Zofran as needed nausea    Essential hypertension  Continue PTA lisinopril hydrochlorothiazide    Obesity class I by BMI 33.9  Elevated blood sugar without diagnosis of diabetes mellitus  Check hemoglobin A1c  [place] sliding-scale corrective coverage insulin if hyperglycemic greater than 200 on BMPs    Medical Decision Making:   I personally reviewed labs: Yes, as listed below  I personally reviewed imaging: Yes, as listed below  Toxic drug monitoring: No  Discussed case with: ED provider. After discussion I am in agreement that acuity of patient's medical condition necessitates hospital stay. Code Status: Full  DVT Prophylaxis: Lovenox  GI Prophylaxis: Not indicated  Baseline:  Independent    Subjective:   CHIEF COMPLAINT: Flank pain, fever/chills    HISTORY OF PRESENT ILLNESS:

## 2023-05-15 NOTE — PROGRESS NOTES
Hospitalist Progress Note    NAME:   Chris Oliveira   : 1970   MRN: [id number]     Date/Time: 5/15/2023 10:19 AM  Patient PCP: Carmina Zamora MD    Estimated discharge date:  Barriers: pyelo, cultures      Assessment / Plan:    Severe sepsis without shock  Pyelonephritis with mild left hydronephrosis  Essential hypertension  Obesity class I by BMI 33.9  Elevated blood sugar without diagnosis of diabetes mellitus       Severe sepsis without shock  Pyelonephritis with mild left hydronephrosis    Continue empiric ceftriaxone  Follow urine culture and adjust antibiotics as indicated  Follow up blood cultures  Toradol as needed fever/pain  -Oxycodone as needed severe pain  Tylenol as needed fevers  Zofran as needed nausea     Essential hypertension  Continue PTA lisinopril hydrochlorothiazide     Obesity class I by BMI 33.9  Elevated blood sugar without diagnosis of diabetes mellitus  Check hemoglobin A1c  [place] sliding-scale corrective coverage insulin if hyperglycemic greater than 200 on BMPs     Medical Decision Making:   I personally reviewed labs: Yes, as listed below  I personally reviewed imaging: Yes, as listed below  Toxic drug monitoring: No  Discussed case with: ED provider. After discussion I am in agreement that acuity of patient's medical condition necessitates hospital stay. Code Status: Full  DVT Prophylaxis: Lovenox  GI Prophylaxis: Not indicated  Baseline: Independent    Subjective:     Chief Complaint / Reason for Physician Visit  Patient seen and examined at the bedside. She said that she is feeling better today. She denies any pain with urination or changes in her urinary habits. She says she is never had a thing like this in the past..  Discussed with RN events overnight. Objective:     VITALS:   Last 24hrs VS reviewed since prior progress note.  Most recent are:  Patient Vitals for the past 24 hrs:   BP Temp Temp src Pulse Resp SpO2 Height Weight   05/15/23

## 2023-05-16 LAB
ANION GAP SERPL CALC-SCNC: 7 MMOL/L (ref 5–15)
BASOPHILS # BLD: 0.1 K/UL (ref 0–0.1)
BASOPHILS NFR BLD: 1 % (ref 0–1)
BUN SERPL-MCNC: 12 MG/DL (ref 6–20)
BUN/CREAT SERPL: 13 (ref 12–20)
CALCIUM SERPL-MCNC: 8.7 MG/DL (ref 8.5–10.1)
CHLORIDE SERPL-SCNC: 106 MMOL/L (ref 97–108)
CO2 SERPL-SCNC: 25 MMOL/L (ref 21–32)
CREAT SERPL-MCNC: 0.9 MG/DL (ref 0.55–1.02)
DIFFERENTIAL METHOD BLD: ABNORMAL
EOSINOPHIL # BLD: 0 K/UL (ref 0–0.4)
EOSINOPHIL NFR BLD: 0 % (ref 0–7)
ERYTHROCYTE [DISTWIDTH] IN BLOOD BY AUTOMATED COUNT: 12.8 % (ref 11.5–14.5)
GLUCOSE SERPL-MCNC: 102 MG/DL (ref 65–100)
HCT VFR BLD AUTO: 39.3 % (ref 35–47)
HGB BLD-MCNC: 12.4 G/DL (ref 11.5–16)
IMM GRANULOCYTES # BLD AUTO: 0.1 K/UL (ref 0–0.04)
IMM GRANULOCYTES NFR BLD AUTO: 1 % (ref 0–0.5)
LYMPHOCYTES # BLD: 1.9 K/UL (ref 0.8–3.5)
LYMPHOCYTES NFR BLD: 16 % (ref 12–49)
MCH RBC QN AUTO: 29.6 PG (ref 26–34)
MCHC RBC AUTO-ENTMCNC: 31.6 G/DL (ref 30–36.5)
MCV RBC AUTO: 93.8 FL (ref 80–99)
MONOCYTES # BLD: 1.2 K/UL (ref 0–1)
MONOCYTES NFR BLD: 10 % (ref 5–13)
NEUTS SEG # BLD: 8.9 K/UL (ref 1.8–8)
NEUTS SEG NFR BLD: 73 % (ref 32–75)
NRBC # BLD: 0 K/UL (ref 0–0.01)
NRBC BLD-RTO: 0 PER 100 WBC
PLATELET # BLD AUTO: 267 K/UL (ref 150–400)
PMV BLD AUTO: 9.7 FL (ref 8.9–12.9)
POTASSIUM SERPL-SCNC: 3.7 MMOL/L (ref 3.5–5.1)
RBC # BLD AUTO: 4.19 M/UL (ref 3.8–5.2)
SODIUM SERPL-SCNC: 138 MMOL/L (ref 136–145)
WBC # BLD AUTO: 12.1 K/UL (ref 3.6–11)

## 2023-05-16 PROCEDURE — 6370000000 HC RX 637 (ALT 250 FOR IP): Performed by: NURSE PRACTITIONER

## 2023-05-16 PROCEDURE — 2580000003 HC RX 258: Performed by: STUDENT IN AN ORGANIZED HEALTH CARE EDUCATION/TRAINING PROGRAM

## 2023-05-16 PROCEDURE — 6370000000 HC RX 637 (ALT 250 FOR IP)

## 2023-05-16 PROCEDURE — 80048 BASIC METABOLIC PNL TOTAL CA: CPT

## 2023-05-16 PROCEDURE — 6360000002 HC RX W HCPCS: Performed by: STUDENT IN AN ORGANIZED HEALTH CARE EDUCATION/TRAINING PROGRAM

## 2023-05-16 PROCEDURE — 2580000003 HC RX 258: Performed by: NURSE PRACTITIONER

## 2023-05-16 PROCEDURE — 85025 COMPLETE CBC W/AUTO DIFF WBC: CPT

## 2023-05-16 PROCEDURE — 6370000000 HC RX 637 (ALT 250 FOR IP): Performed by: STUDENT IN AN ORGANIZED HEALTH CARE EDUCATION/TRAINING PROGRAM

## 2023-05-16 PROCEDURE — 1170000000 HC RM PRIVATE ONCOLOGY

## 2023-05-16 PROCEDURE — 94760 N-INVAS EAR/PLS OXIMETRY 1: CPT

## 2023-05-16 PROCEDURE — 36415 COLL VENOUS BLD VENIPUNCTURE: CPT

## 2023-05-16 RX ORDER — SODIUM CHLORIDE 9 MG/ML
INJECTION, SOLUTION INTRAVENOUS CONTINUOUS
Status: DISCONTINUED | OUTPATIENT
Start: 2023-05-16 | End: 2023-05-18 | Stop reason: HOSPADM

## 2023-05-16 RX ORDER — DIPHENHYDRAMINE HCL 25 MG
25 CAPSULE ORAL NIGHTLY PRN
Status: DISCONTINUED | OUTPATIENT
Start: 2023-05-16 | End: 2023-05-18 | Stop reason: HOSPADM

## 2023-05-16 RX ORDER — ZOLPIDEM TARTRATE 5 MG/1
5 TABLET ORAL NIGHTLY PRN
Status: DISCONTINUED | OUTPATIENT
Start: 2023-05-17 | End: 2023-05-18 | Stop reason: HOSPADM

## 2023-05-16 RX ORDER — ZOLPIDEM TARTRATE 5 MG/1
5 TABLET ORAL NIGHTLY PRN
Status: DISCONTINUED | OUTPATIENT
Start: 2023-05-16 | End: 2023-05-16

## 2023-05-16 RX ORDER — ZOLPIDEM TARTRATE 5 MG/1
5 TABLET ORAL ONCE
Status: COMPLETED | OUTPATIENT
Start: 2023-05-16 | End: 2023-05-16

## 2023-05-16 RX ADMIN — KETOROLAC TROMETHAMINE 15 MG: 30 INJECTION, SOLUTION INTRAMUSCULAR; INTRAVENOUS at 08:26

## 2023-05-16 RX ADMIN — ACETAMINOPHEN 650 MG: 325 TABLET ORAL at 19:50

## 2023-05-16 RX ADMIN — OXYCODONE 5 MG: 5 TABLET ORAL at 16:49

## 2023-05-16 RX ADMIN — HYDROCHLOROTHIAZIDE 12.5 MG: 25 TABLET ORAL at 08:25

## 2023-05-16 RX ADMIN — SODIUM CHLORIDE, PRESERVATIVE FREE 10 ML: 5 INJECTION INTRAVENOUS at 08:27

## 2023-05-16 RX ADMIN — KETOROLAC TROMETHAMINE 15 MG: 30 INJECTION, SOLUTION INTRAMUSCULAR; INTRAVENOUS at 14:33

## 2023-05-16 RX ADMIN — OXYCODONE 5 MG: 5 TABLET ORAL at 01:31

## 2023-05-16 RX ADMIN — OXYCODONE 5 MG: 5 TABLET ORAL at 05:15

## 2023-05-16 RX ADMIN — OXYCODONE 5 MG: 5 TABLET ORAL at 22:49

## 2023-05-16 RX ADMIN — SODIUM CHLORIDE: 9 INJECTION, SOLUTION INTRAVENOUS at 12:43

## 2023-05-16 RX ADMIN — ZOLPIDEM TARTRATE 5 MG: 5 TABLET ORAL at 12:28

## 2023-05-16 RX ADMIN — ONDANSETRON 4 MG: 4 TABLET, ORALLY DISINTEGRATING ORAL at 16:49

## 2023-05-16 RX ADMIN — SODIUM CHLORIDE 1000 MG: 900 INJECTION INTRAVENOUS at 08:26

## 2023-05-16 RX ADMIN — SODIUM CHLORIDE: 9 INJECTION, SOLUTION INTRAVENOUS at 22:50

## 2023-05-16 RX ADMIN — OXYCODONE 5 MG: 5 TABLET ORAL at 10:07

## 2023-05-16 RX ADMIN — KETOROLAC TROMETHAMINE 15 MG: 30 INJECTION, SOLUTION INTRAMUSCULAR; INTRAVENOUS at 00:12

## 2023-05-16 RX ADMIN — SODIUM CHLORIDE, PRESERVATIVE FREE 10 ML: 5 INJECTION INTRAVENOUS at 21:20

## 2023-05-16 RX ADMIN — KETOROLAC TROMETHAMINE 15 MG: 30 INJECTION, SOLUTION INTRAMUSCULAR; INTRAVENOUS at 20:30

## 2023-05-16 RX ADMIN — MELATONIN 3 MG: at 20:34

## 2023-05-16 RX ADMIN — LISINOPRIL 20 MG: 20 TABLET ORAL at 08:26

## 2023-05-16 RX ADMIN — ONDANSETRON 4 MG: 4 TABLET, ORALLY DISINTEGRATING ORAL at 05:15

## 2023-05-16 ASSESSMENT — PAIN DESCRIPTION - DESCRIPTORS
DESCRIPTORS: ACHING
DESCRIPTORS: ACHING;DULL
DESCRIPTORS: ACHING
DESCRIPTORS: ACHING;DULL

## 2023-05-16 ASSESSMENT — PAIN DESCRIPTION - ONSET
ONSET: ON-GOING

## 2023-05-16 ASSESSMENT — PAIN - FUNCTIONAL ASSESSMENT
PAIN_FUNCTIONAL_ASSESSMENT: ACTIVITIES ARE NOT PREVENTED

## 2023-05-16 ASSESSMENT — PAIN DESCRIPTION - ORIENTATION
ORIENTATION: LEFT
ORIENTATION: MID;LOWER
ORIENTATION: LEFT
ORIENTATION: LEFT;ANTERIOR
ORIENTATION: LEFT

## 2023-05-16 ASSESSMENT — PAIN SCALES - GENERAL
PAINLEVEL_OUTOF10: 6
PAINLEVEL_OUTOF10: 1
PAINLEVEL_OUTOF10: 5
PAINLEVEL_OUTOF10: 7
PAINLEVEL_OUTOF10: 2
PAINLEVEL_OUTOF10: 7
PAINLEVEL_OUTOF10: 2
PAINLEVEL_OUTOF10: 7
PAINLEVEL_OUTOF10: 6
PAINLEVEL_OUTOF10: 1
PAINLEVEL_OUTOF10: 6
PAINLEVEL_OUTOF10: 3
PAINLEVEL_OUTOF10: 7
PAINLEVEL_OUTOF10: 8
PAINLEVEL_OUTOF10: 3
PAINLEVEL_OUTOF10: 3

## 2023-05-16 ASSESSMENT — PAIN DESCRIPTION - FREQUENCY
FREQUENCY: CONTINUOUS

## 2023-05-16 ASSESSMENT — PAIN DESCRIPTION - LOCATION
LOCATION: ABDOMEN
LOCATION: ABDOMEN
LOCATION: ABDOMEN;BACK
LOCATION: ABDOMEN;BACK
LOCATION: ABDOMEN
LOCATION: ABDOMEN
LOCATION: ABDOMEN;BACK
LOCATION: ABDOMEN
LOCATION: ABDOMEN
LOCATION: ABDOMEN;BACK
LOCATION: ABDOMEN
LOCATION: ABDOMEN;BACK
LOCATION: ABDOMEN;BACK

## 2023-05-16 ASSESSMENT — PAIN DESCRIPTION - PAIN TYPE: TYPE: ACUTE PAIN

## 2023-05-16 NOTE — PROGRESS NOTES
End of Shift Note    Bedside shift change report given to Wally Oconnor RN(oncoming nurse) by Felecia Seay RN (offgoing nurse). Report included the following information SBAR, Kardex, Intake/Output, MAR, and Recent Results    Shift worked:  7p-7a     Shift summary and any significant changes:    Scheduled meds given see MAR also PRN Tylenol 1X, Toradol 1X  Gogo 3X Pt complained of pain when ever she was awake. Pt took melatonin and benadryl to promote sleep. Zofran 1x for nausea. Pt had a lot of anxiety over her pain and fever. Concerns for physician to address:       Zone phone for oncoming shift:          Activity:     Number times ambulated in hallways past shift: 0  Number of times OOB to chair past shift: 2    Cardiac:   Cardiac Monitoring: Yes           Access:  Current line(s): PIV     Genitourinary:   Urinary status: voiding    Respiratory:      Chronic home O2 use?: NO  Incentive spirometer at bedside: NO       GI:     Current diet:  ADULT DIET; Regular; 4 carb choices (60 gm/meal);  Low Fat/Low Chol/High Fiber/2 gm Na  Passing flatus: YES  Tolerating current diet: NO       Pain Management:   Patient states pain is manageable on current regimen: NO    Skin:     Interventions: increase time out of bed    Patient Safety:  Fall Score:    Interventions: gripper socks       Length of Stay:  Expected LOS: 3  Actual LOS: 1      Felecia Seay RN

## 2023-05-16 NOTE — CONSULTS
Urology Consult    Patient: Faheem Davila MRN: [id number]  SSN: [id number]    YOB: 1970  Age: 50 y.o. Sex: female          Date of Consultation:  May 16, 2023  Requesting Physician: Ted Riggins MD  Reason for Consultation: pyelonephritis            Assessment/Plan:  Mild left hydronephrosis in setting of febrile pyelonephritis/urosepsis  Blood cultures pending   Non obstructing 7mm left renal stone     -continue HD support, IVF fluid, empiric abx, follow cultures  -fever curve trending down, pain tolerated   -no urologic surgical intervention at this time, can follow peripherally while inpatient   - to arrange OP follow up closer to discharge for stone follow up     Supervising MD, Dr. Rosaura Coates      History of Present Illness:  Patient is a 50 y.o. female admitted 5/15/2023 to the hospital for Pyelonephritis [N12]. She has a PMHx significant for essential hypertension who presents with complaints of progressively worsening fever/chills, rigors, nausea/vomiting, left abdominal/flank pain and foul-smelling urine that is been ongoing since Thursday. Patient reports experiencing significant increase in pain and shaking chills with fever up to 103 °F prompting presentation emergency department for evaluation. She reports history of kidney stones, but denies history of pyelonephritis. Urology consulted after results of CT a/p revealing mild left hydronephrosis with febrile UTI. Pt seen and examined. She states left flank pain greatly improved since yesterday. She now denies voiding dysfunction including hematuria, suprapubic pain, incontinence, incomplete emptying, and dysuria. She has persistent chills this morning with 24 hour tmax 101. 103 temp on admission. She is currently afebrile with VSS. CT-scan of the abdomen 5/15  IMPRESSION:  Mild left hydronephrosis without ureteral stone. Nonobstructing left renal  stones. No acute abnormality.  No bladder distention

## 2023-05-16 NOTE — PROGRESS NOTES
Full consult to follow  Left flank pain believed pyelo. Afebrile, HDS WBC 12, Urine 100K GNR, Bcx pending. CT images seen with nonobstructing 7mm left renal stone. left extrarenal pelvis, mild left hydronephrosis full length of ureter w/o obstructing mechanism. Bladder not overdistended. Impression:    7mm left renal stone - nonobstructing  Left pyelo - as you are  Mild left hydronephrosis - consitent with relative aperistalsis associated with pyelo. Do not expect need for drainage (stent/pcn) as long as clinically responding.

## 2023-05-16 NOTE — PROGRESS NOTES
End of Shift Note    Bedside shift change report given to Irving Bashir RN (oncoming nurse) by Aaron Villanueva RN (offgoing nurse). Report included the following information SBAR, Kardex, and MAR    Shift worked:  12:30pm-7pm     Shift summary and any significant changes:     Pt moved to room 1111 from 1135 at 12:30 pm; report received from Indira Freedman [facility]. Pt tolerated care fairly well. Medications were given and education was provided. PRN Zofran given x1 for nausea. Hourly rounding completed. Pt complaints of pain were treated with PRN pain medications (See MAR); Toradol given x1, Oxy given x1. Pt up ad ravi in room. Family present at bedside.           Aaron Villanueva RN

## 2023-05-16 NOTE — PROGRESS NOTES
Hospitalist Progress Note    NAME:   Oralia Bowser   : 1970   MRN: [id number]     Date/Time: 2023 11:08 AM  Patient PCP: Edel Duval MD    Estimated discharge date: 48-72 hours  Barriers: Patient is not clinically stable for discharge. Febrile overnight still experiencing chills and body aches. Blood cultures pending. Assessment / Plan:  Severe sepsis without shock        Pyelonephritis with mild left hydronephrosis         Leukocytosis  -  chest xray shows no acute process  - CT of abdomen/pelvis shows:  mild left hydronephrosis without ureteral stone. Nonobstructing left renal stones, largest is 8mm. - urinalysis on 5/15/23 shows positive nitrites, moderate leukocytes, +4 bacteria. - preliminary urine culture shows gram negative rods  - preliminary blood cultures show no growth at 1 day  - lactic acid 1.35  - continue empiric rocephin until cultures result. - continue IVF and current pain medications  - no surgical intervention at present per urology  - urology following    2. Essential HTN  - continue lisinopril/HCTZ    3. Obesity class 1 by BMI 33.9  - hemoglobin A1c pending  - blood glucose levels currently controlled    4. Insomnia  - has not slept in several days  - will order Ambien        Medical Decision Making:   I personally reviewed labs: CBC and BMP  I personally reviewed imaging: xray and CT of abdomen/pelvis  Toxic drug monitoring: N/A  Discussed case with: nursing, patient, Dr. Sukhjinder Little    Patient initially wanted to sign out AMA however after discussing risks of leaving including worsening of sepsis, deterioration, and or death patient decided to stay for further treatment. Code Status: Full Code  DVT Prophylaxis:  Lovenox  GI Prophylaxis:N/A    Subjective:     Chief Complaint / Reason for Physician Visit  Ms. Bronson Lamas is a 50year old female who presented to the emergency room with left flank pain and strong odor to urine which has been present for approximately 3

## 2023-05-16 NOTE — FLOWSHEET NOTE
05/16/23 1230   Vital Signs   Temp 98.2 °F (36.8 °C)   Temp Source Oral   Pulse 82   Heart Rate Source Monitor   Respirations 16   /89   MAP (Calculated) 106   BP Location Left Arm   BP Method Automatic   Patient Position Supine   Level of Consciousness 0   MEWS Score 1     Patient transferred to room 1111. Vital signs at time of transfer stable (see above). Dual skin WDL. Patient given Leonid Nimesh per provider order for sleep.      Report given to [facility]

## 2023-05-17 LAB
ANION GAP SERPL CALC-SCNC: 3 MMOL/L (ref 5–15)
BACTERIA SPEC CULT: ABNORMAL
BASOPHILS # BLD: 0 K/UL (ref 0–0.1)
BASOPHILS NFR BLD: 0 % (ref 0–1)
BUN SERPL-MCNC: 12 MG/DL (ref 6–20)
BUN/CREAT SERPL: 17 (ref 12–20)
CALCIUM SERPL-MCNC: 8.5 MG/DL (ref 8.5–10.1)
CC UR VC: ABNORMAL
CHLORIDE SERPL-SCNC: 107 MMOL/L (ref 97–108)
CO2 SERPL-SCNC: 27 MMOL/L (ref 21–32)
CREAT SERPL-MCNC: 0.71 MG/DL (ref 0.55–1.02)
DIFFERENTIAL METHOD BLD: ABNORMAL
EOSINOPHIL # BLD: 0.1 K/UL (ref 0–0.4)
EOSINOPHIL NFR BLD: 1 % (ref 0–7)
ERYTHROCYTE [DISTWIDTH] IN BLOOD BY AUTOMATED COUNT: 13.2 % (ref 11.5–14.5)
EST. AVERAGE GLUCOSE BLD GHB EST-MCNC: 105 MG/DL
EST. AVERAGE GLUCOSE BLD GHB EST-MCNC: 105 MG/DL
GLUCOSE SERPL-MCNC: 109 MG/DL (ref 65–100)
HBA1C MFR BLD: 5.3 % (ref 4–5.6)
HBA1C MFR BLD: 5.3 % (ref 4–5.6)
HCT VFR BLD AUTO: 33.2 % (ref 35–47)
HGB BLD-MCNC: 10.4 G/DL (ref 11.5–16)
IMM GRANULOCYTES # BLD AUTO: 0.1 K/UL (ref 0–0.04)
IMM GRANULOCYTES NFR BLD AUTO: 1 % (ref 0–0.5)
LYMPHOCYTES # BLD: 1.7 K/UL (ref 0.8–3.5)
LYMPHOCYTES NFR BLD: 19 % (ref 12–49)
MAGNESIUM SERPL-MCNC: 2.2 MG/DL (ref 1.6–2.4)
MCH RBC QN AUTO: 28.4 PG (ref 26–34)
MCHC RBC AUTO-ENTMCNC: 31.3 G/DL (ref 30–36.5)
MCV RBC AUTO: 90.7 FL (ref 80–99)
MONOCYTES # BLD: 0.7 K/UL (ref 0–1)
MONOCYTES NFR BLD: 7 % (ref 5–13)
NEUTS SEG # BLD: 6.6 K/UL (ref 1.8–8)
NEUTS SEG NFR BLD: 72 % (ref 32–75)
NRBC # BLD: 0 K/UL (ref 0–0.01)
NRBC BLD-RTO: 0 PER 100 WBC
PHOSPHATE SERPL-MCNC: 3.4 MG/DL (ref 2.6–4.7)
PLATELET # BLD AUTO: 278 K/UL (ref 150–400)
PMV BLD AUTO: 9.8 FL (ref 8.9–12.9)
POTASSIUM SERPL-SCNC: 3.6 MMOL/L (ref 3.5–5.1)
RBC # BLD AUTO: 3.66 M/UL (ref 3.8–5.2)
SERVICE CMNT-IMP: ABNORMAL
SODIUM SERPL-SCNC: 137 MMOL/L (ref 136–145)
WBC # BLD AUTO: 9.2 K/UL (ref 3.6–11)

## 2023-05-17 PROCEDURE — 6370000000 HC RX 637 (ALT 250 FOR IP): Performed by: STUDENT IN AN ORGANIZED HEALTH CARE EDUCATION/TRAINING PROGRAM

## 2023-05-17 PROCEDURE — 2580000003 HC RX 258: Performed by: STUDENT IN AN ORGANIZED HEALTH CARE EDUCATION/TRAINING PROGRAM

## 2023-05-17 PROCEDURE — 84100 ASSAY OF PHOSPHORUS: CPT

## 2023-05-17 PROCEDURE — 85025 COMPLETE CBC W/AUTO DIFF WBC: CPT

## 2023-05-17 PROCEDURE — 83735 ASSAY OF MAGNESIUM: CPT

## 2023-05-17 PROCEDURE — 36415 COLL VENOUS BLD VENIPUNCTURE: CPT

## 2023-05-17 PROCEDURE — 6360000002 HC RX W HCPCS: Performed by: STUDENT IN AN ORGANIZED HEALTH CARE EDUCATION/TRAINING PROGRAM

## 2023-05-17 PROCEDURE — 80048 BASIC METABOLIC PNL TOTAL CA: CPT

## 2023-05-17 PROCEDURE — 6370000000 HC RX 637 (ALT 250 FOR IP): Performed by: NURSE PRACTITIONER

## 2023-05-17 PROCEDURE — 1170000000 HC RM PRIVATE ONCOLOGY

## 2023-05-17 PROCEDURE — 83036 HEMOGLOBIN GLYCOSYLATED A1C: CPT

## 2023-05-17 PROCEDURE — 2580000003 HC RX 258: Performed by: NURSE PRACTITIONER

## 2023-05-17 RX ADMIN — ONDANSETRON 4 MG: 4 TABLET, ORALLY DISINTEGRATING ORAL at 17:48

## 2023-05-17 RX ADMIN — KETOROLAC TROMETHAMINE 15 MG: 30 INJECTION, SOLUTION INTRAMUSCULAR; INTRAVENOUS at 08:44

## 2023-05-17 RX ADMIN — OXYCODONE 5 MG: 5 TABLET ORAL at 05:58

## 2023-05-17 RX ADMIN — ZOLPIDEM TARTRATE 5 MG: 5 TABLET ORAL at 21:31

## 2023-05-17 RX ADMIN — SODIUM CHLORIDE, PRESERVATIVE FREE 10 ML: 5 INJECTION INTRAVENOUS at 20:46

## 2023-05-17 RX ADMIN — KETOROLAC TROMETHAMINE 15 MG: 30 INJECTION, SOLUTION INTRAMUSCULAR; INTRAVENOUS at 20:46

## 2023-05-17 RX ADMIN — SODIUM CHLORIDE 1000 MG: 900 INJECTION INTRAVENOUS at 08:43

## 2023-05-17 RX ADMIN — OXYCODONE 5 MG: 5 TABLET ORAL at 17:48

## 2023-05-17 RX ADMIN — HYDROCHLOROTHIAZIDE 12.5 MG: 25 TABLET ORAL at 08:42

## 2023-05-17 RX ADMIN — LISINOPRIL 20 MG: 20 TABLET ORAL at 08:42

## 2023-05-17 RX ADMIN — OXYCODONE 5 MG: 5 TABLET ORAL at 11:54

## 2023-05-17 RX ADMIN — SODIUM CHLORIDE: 9 INJECTION, SOLUTION INTRAVENOUS at 21:31

## 2023-05-17 RX ADMIN — SODIUM CHLORIDE, PRESERVATIVE FREE 10 ML: 5 INJECTION INTRAVENOUS at 08:43

## 2023-05-17 ASSESSMENT — PAIN SCALES - GENERAL
PAINLEVEL_OUTOF10: 2
PAINLEVEL_OUTOF10: 3
PAINLEVEL_OUTOF10: 7
PAINLEVEL_OUTOF10: 7
PAINLEVEL_OUTOF10: 6
PAINLEVEL_OUTOF10: 3
PAINLEVEL_OUTOF10: 3
PAINLEVEL_OUTOF10: 6
PAINLEVEL_OUTOF10: 7

## 2023-05-17 ASSESSMENT — PAIN DESCRIPTION - DESCRIPTORS
DESCRIPTORS: ACHING

## 2023-05-17 ASSESSMENT — PAIN DESCRIPTION - ORIENTATION
ORIENTATION: LEFT

## 2023-05-17 ASSESSMENT — PAIN DESCRIPTION - LOCATION
LOCATION: ABDOMEN

## 2023-05-17 ASSESSMENT — PAIN - FUNCTIONAL ASSESSMENT
PAIN_FUNCTIONAL_ASSESSMENT: ACTIVITIES ARE NOT PREVENTED

## 2023-05-17 NOTE — PROGRESS NOTES
End of Shift Note    Bedside shift change report given to Lisa Fitch RN (oncoming nurse) by Surjit Nunez RN (offgoing nurse). Report included the following information SBAR, Kardex, and MAR    Shift worked:  1pm-7pm     Shift summary and any significant changes:     Pt tolerated care fairly well. Medications were given and education was provided. PRN Zofran given x1 for nausea. Hourly rounding completed. Pt complaints of pain were treated with PRN pain medications (See MAR); Toradol given x1, Oxy given x2. Pt up ad ravi in room. Family present at bedside. Pt had a BM during this shift. CHG bath completed. Labs completed.           Surjit Nunez RN

## 2023-05-17 NOTE — PROGRESS NOTES
End of Shift Note     Bedside shift change report given to Arleen Crane RN (oncoming nurse) by Jude Henry RN (offgoing nurse). Report included the following information SBAR, Kardex, and MAR     Shift worked:  7p-7a      Shift summary and any significant changes:      Pt is A&Ox4. Pt reported pain in abdomen and was medicated according to orders. Pt is able to ambulate with steady gait. Pt is receiving iv fluids. Pt slept all night comfortably. Call bell is within reach.

## 2023-05-17 NOTE — CARE COORDINATION
Care Management Initial Assessment       RUR: 7%  Readmission? No  1st IM letter given? No  1st  letter given: No     05/17/23 1531   Service Assessment   Patient Orientation Alert and Oriented   Cognition Alert   History Provided By Patient   Primary Caregiver Self   Support Systems Children;Family Members   PCP Verified by CM Yes   Last Visit to PCP Within last 3 months   Prior Functional Level Independent in ADLs/IADLs   Current Functional Level Independent in ADLs/IADLs   Can patient return to prior living arrangement Yes   Ability to make needs known: Good   Family able to assist with home care needs: Yes   Would you like for me to discuss the discharge plan with any other family members/significant others, and if so, who? No   Financial Resources Other (Comment)  (Manage plan)   Community Resources None   Social/Functional History   Lives With Family   Type of Home House   Discharge Planning   Type of Residence House   Living Arrangements Children   Current Services Prior To Admission None   Patient expects to be discharged to: House     Pt denies additional needs at the time of d/c. Pt will have transport home provided by family. CM will continue to follow.     ANABELA Epstein CM  [phone]

## 2023-05-17 NOTE — PROGRESS NOTES
Hospitalist Progress Note    NAME:   Aixa Hernandes   : 1970   MRN: [id number]     Date/Time: 2023 10:40 AM  Patient PCP: Gaviota Lester MD    Estimated discharge date: 48 hours  Barriers: pending blood culture results, pain control. Assessment / Plan:  Severe sepsis without shock        Pyelonephritis with mild left hydronephrosis         Leukocytosis - resolved  - patient has been afebrile for 48 hours  -  chest xray shows no acute process  - CT of abdomen/pelvis shows:  mild left hydronephrosis without ureteral stone. Nonobstructing left renal stones, largest is 8mm. - urinalysis on 5/15/23 shows positive nitrites, moderate leukocytes, +4 bacteria. - final urine culture shows e.coli  - preliminary blood cultures show no growth at 2 days. - lactic acid 1.35  - continue empiric rocephin until cultures result. - continue IVF and current pain medications  - no surgical intervention at present per urology  - urology following     2. Essential HTN  - continue lisinopril/HCTZ     3. Obesity class 1 by BMI 33.9  - hemoglobin A1c 5.3  - blood glucose levels currently controlled     4. Insomnia - resolved  - continue ambien prn           Medical Decision Making:   I personally reviewed labs: CBC and BMP  I personally reviewed imaging: xray and CT of abdomen/pelvis  Toxic drug monitoring: N/A  Discussed case with: nursing, patient, Dr. Elvira Dumont     Patient initially wanted to sign out AMA however after discussing risks of leaving including worsening of sepsis, deterioration, and or death patient decided to stay for further treatment. Code Status: Full Code  DVT Prophylaxis:  Lovenox  GI Prophylaxis:N/A     Subjective:      Chief Complaint / Reason for Physician Visit  Ms. Valentin Camejo is a 50year old female who presented to the emergency room with left flank pain and strong odor to urine which has been present for approximately 3 days.  Patient has an IUD in place and does not known when her last

## 2023-05-18 VITALS
WEIGHT: 217 LBS | SYSTOLIC BLOOD PRESSURE: 144 MMHG | BODY MASS INDEX: 34.06 KG/M2 | DIASTOLIC BLOOD PRESSURE: 89 MMHG | RESPIRATION RATE: 16 BRPM | OXYGEN SATURATION: 98 % | HEART RATE: 81 BPM | TEMPERATURE: 98.4 F | HEIGHT: 67 IN

## 2023-05-18 LAB
ANION GAP SERPL CALC-SCNC: 4 MMOL/L (ref 5–15)
BASOPHILS # BLD: 0.1 K/UL (ref 0–0.1)
BASOPHILS NFR BLD: 1 % (ref 0–1)
BUN SERPL-MCNC: 7 MG/DL (ref 6–20)
BUN/CREAT SERPL: 9 (ref 12–20)
CALCIUM SERPL-MCNC: 8.6 MG/DL (ref 8.5–10.1)
CHLORIDE SERPL-SCNC: 108 MMOL/L (ref 97–108)
CO2 SERPL-SCNC: 28 MMOL/L (ref 21–32)
CREAT SERPL-MCNC: 0.74 MG/DL (ref 0.55–1.02)
DIFFERENTIAL METHOD BLD: ABNORMAL
EOSINOPHIL # BLD: 0.2 K/UL (ref 0–0.4)
EOSINOPHIL NFR BLD: 2 % (ref 0–7)
ERYTHROCYTE [DISTWIDTH] IN BLOOD BY AUTOMATED COUNT: 13 % (ref 11.5–14.5)
GLUCOSE SERPL-MCNC: 91 MG/DL (ref 65–100)
HCT VFR BLD AUTO: 40.5 % (ref 35–47)
HGB BLD-MCNC: 12.6 G/DL (ref 11.5–16)
IMM GRANULOCYTES # BLD AUTO: 0.1 K/UL (ref 0–0.04)
IMM GRANULOCYTES NFR BLD AUTO: 1 % (ref 0–0.5)
LYMPHOCYTES # BLD: 2.7 K/UL (ref 0.8–3.5)
LYMPHOCYTES NFR BLD: 34 % (ref 12–49)
MAGNESIUM SERPL-MCNC: 2.2 MG/DL (ref 1.6–2.4)
MCH RBC QN AUTO: 28.5 PG (ref 26–34)
MCHC RBC AUTO-ENTMCNC: 31.1 G/DL (ref 30–36.5)
MCV RBC AUTO: 91.6 FL (ref 80–99)
MONOCYTES # BLD: 0.6 K/UL (ref 0–1)
MONOCYTES NFR BLD: 7 % (ref 5–13)
NEUTS SEG # BLD: 4.4 K/UL (ref 1.8–8)
NEUTS SEG NFR BLD: 55 % (ref 32–75)
NRBC # BLD: 0 K/UL (ref 0–0.01)
NRBC BLD-RTO: 0 PER 100 WBC
PHOSPHATE SERPL-MCNC: 3.2 MG/DL (ref 2.6–4.7)
PLATELET # BLD AUTO: 338 K/UL (ref 150–400)
PMV BLD AUTO: 9.6 FL (ref 8.9–12.9)
POTASSIUM SERPL-SCNC: 3.5 MMOL/L (ref 3.5–5.1)
RBC # BLD AUTO: 4.42 M/UL (ref 3.8–5.2)
SODIUM SERPL-SCNC: 140 MMOL/L (ref 136–145)
WBC # BLD AUTO: 7.9 K/UL (ref 3.6–11)

## 2023-05-18 PROCEDURE — 85025 COMPLETE CBC W/AUTO DIFF WBC: CPT

## 2023-05-18 PROCEDURE — 36415 COLL VENOUS BLD VENIPUNCTURE: CPT

## 2023-05-18 PROCEDURE — 80048 BASIC METABOLIC PNL TOTAL CA: CPT

## 2023-05-18 PROCEDURE — 6370000000 HC RX 637 (ALT 250 FOR IP): Performed by: STUDENT IN AN ORGANIZED HEALTH CARE EDUCATION/TRAINING PROGRAM

## 2023-05-18 PROCEDURE — 83735 ASSAY OF MAGNESIUM: CPT

## 2023-05-18 PROCEDURE — 84100 ASSAY OF PHOSPHORUS: CPT

## 2023-05-18 PROCEDURE — 6360000002 HC RX W HCPCS: Performed by: STUDENT IN AN ORGANIZED HEALTH CARE EDUCATION/TRAINING PROGRAM

## 2023-05-18 PROCEDURE — 2580000003 HC RX 258: Performed by: STUDENT IN AN ORGANIZED HEALTH CARE EDUCATION/TRAINING PROGRAM

## 2023-05-18 RX ORDER — OXYCODONE HYDROCHLORIDE 5 MG/1
5 TABLET ORAL EVERY 4 HOURS PRN
Qty: 10 TABLET | Refills: 0 | Status: SHIPPED | OUTPATIENT
Start: 2023-05-18 | End: 2023-05-21

## 2023-05-18 RX ORDER — ONDANSETRON 4 MG/1
4 TABLET, ORALLY DISINTEGRATING ORAL EVERY 8 HOURS PRN
Qty: 20 TABLET | Refills: 0 | Status: SHIPPED | OUTPATIENT
Start: 2023-05-18

## 2023-05-18 RX ORDER — CEFDINIR 300 MG/1
300 CAPSULE ORAL 2 TIMES DAILY
Qty: 10 CAPSULE | Refills: 0 | Status: SHIPPED | OUTPATIENT
Start: 2023-05-18 | End: 2023-05-28

## 2023-05-18 RX ADMIN — SODIUM CHLORIDE, PRESERVATIVE FREE 10 ML: 5 INJECTION INTRAVENOUS at 08:36

## 2023-05-18 RX ADMIN — OXYCODONE 5 MG: 5 TABLET ORAL at 10:45

## 2023-05-18 RX ADMIN — SODIUM CHLORIDE 1000 MG: 900 INJECTION INTRAVENOUS at 08:30

## 2023-05-18 RX ADMIN — HYDROCHLOROTHIAZIDE 12.5 MG: 25 TABLET ORAL at 08:36

## 2023-05-18 RX ADMIN — OXYCODONE 5 MG: 5 TABLET ORAL at 05:06

## 2023-05-18 RX ADMIN — KETOROLAC TROMETHAMINE 15 MG: 30 INJECTION, SOLUTION INTRAMUSCULAR; INTRAVENOUS at 08:31

## 2023-05-18 RX ADMIN — LISINOPRIL 20 MG: 20 TABLET ORAL at 08:30

## 2023-05-18 ASSESSMENT — PAIN SCALES - GENERAL
PAINLEVEL_OUTOF10: 6
PAINLEVEL_OUTOF10: 4
PAINLEVEL_OUTOF10: 6
PAINLEVEL_OUTOF10: 6

## 2023-05-18 ASSESSMENT — PAIN DESCRIPTION - DESCRIPTORS
DESCRIPTORS: ACHING
DESCRIPTORS: ACHING
DESCRIPTORS: HEAVINESS
DESCRIPTORS: ACHING

## 2023-05-18 ASSESSMENT — PAIN DESCRIPTION - ORIENTATION
ORIENTATION: LEFT

## 2023-05-18 ASSESSMENT — PAIN DESCRIPTION - LOCATION
LOCATION: ABDOMEN;FLANK
LOCATION: ABDOMEN
LOCATION: ABDOMEN;FLANK
LOCATION: ABDOMEN;FLANK

## 2023-05-18 NOTE — PROGRESS NOTES
[address] [place] [address] follow-up transitional care appointment has been scheduled with Dr. Dominique Goodrich on 5/25/23 at 0910. Pending patient discharge. Darlena Skiff, Care Management Assistant     [facility] follow-up transitional care appointment has been scheduled with Dr. Karen Vazquez on 5/26/23 at 1030. Pending patient discharge.   Darlena Skiff, Care Management Assistant

## 2023-05-18 NOTE — PROGRESS NOTES
End of Shift Note     Bedside shift change report given to Lauren THURMAN (oncoming nurse) by Angela Nieto RN (offgoing nurse). Report included the following information SBAR, Kardex, and MAR     Shift worked:  7p-7a      Shift summary and any significant changes:      Pt is A&Ox4. Pt reported pain in abdomen x2 during this shift and was medicated according to orders. Pt is able to ambulate with steady gait. Pt is receiving continuous iv fluids and IV antibiotics for the UTI. Pt slept all night comfortably. Call bell is within reach.

## 2023-05-18 NOTE — DISCHARGE SUMMARY
Discharge Summary    Name: James Saleem  [id number]  YOB: 1970 (Age: 50 y.o.)   Date of Admission: 5/15/2023  Date of Discharge: 5/18/2023  Attending Physician: Jero Cronin MD    Discharge Diagnosis: Pyelonephritis/urosepsis    Consultations:  IP CONSULT TO Jeannie Ware      Brief Admission History/Reason for Admission/ Hospital Course:  Ms. Elliott Vigil is a 50year old female who presented to the emergency room on 5/15/23 with left flank pain and strong odor to urine which has been present for approximately 3 days. Patient was admitted with severe sepsis without shock secondary to pyelonephritis with mid left hydronephrosis. CT of abdomen/pelvis showed mild left hydronephrosis without ureteral stone. Nonobstructing left renal stones, largest is 8mm. Urinalysis on 5/15/23 shows positive nitrites, moderate leukocytes, +4 bacteria. Final urine culture shows e.coli. Blood cultures show no growth at 2 days. Patient was evaluated by urology. No surgical intervention per urology during hospitalization. Patient received 4 days of IV rocephin along with IVF hydration with improvement in symptoms. Will transition to oral cefdinir at discharge. Patient to continue anti-hypertensive medication at home. Patient is stable for discharge on today and has been cleared by urology for outpatient follow up. Discharge Exam:  Patient seen and examined by me on discharge day. Patient observed resting in bed with eyes opened. Complains of left flank soreness. Denies chest pain, shortness of breath, palpitations, lower extremity pain/swelling, dizziness or distress. No acute distress noted.     Pertinent Findings:  Patient Vitals for the past 24 hrs:   BP Temp Temp src Pulse Resp SpO2   05/18/23 0727 (!) 144/89 -- Oral 81 16 98 %   05/18/23 0324 136/75 98.2 °F (36.8 °C) Oral 78 16 98 %   05/17/23 1927 134/76 98.4 °F (36.9 °C) Oral 83 18 100 %   05/17/23 1845 -- -- -- -- 16 --

## 2023-05-18 NOTE — PROGRESS NOTES
5/18/2023        RE: Patricio Dupont         [facility] [address] [facility]          To Whom It May Concern,      Due to medical reasons, Patricio Dupont   should remain out of work until after Urologist's appointment Thursday, May 25, 2023.         Sincerely,      Madonna Leslie RN 15

## 2023-05-19 NOTE — PROGRESS NOTES

## 2023-05-21 LAB
BACTERIA SPEC CULT: NORMAL
SERVICE CMNT-IMP: NORMAL

## 2023-07-26 ENCOUNTER — APPOINTMENT (OUTPATIENT)
Facility: HOSPITAL | Age: 53
End: 2023-07-26
Payer: COMMERCIAL

## 2023-07-26 ENCOUNTER — HOSPITAL ENCOUNTER (EMERGENCY)
Facility: HOSPITAL | Age: 53
Discharge: HOME | End: 2023-07-26
Attending: EMERGENCY MEDICINE
Payer: COMMERCIAL

## 2023-07-26 VITALS
BODY MASS INDEX: 34.99 KG/M2 | OXYGEN SATURATION: 94 % | TEMPERATURE: 98.2 F | DIASTOLIC BLOOD PRESSURE: 79 MMHG | SYSTOLIC BLOOD PRESSURE: 115 MMHG | WEIGHT: 210 LBS | HEIGHT: 65 IN | RESPIRATION RATE: 20 BRPM | HEART RATE: 77 BPM

## 2023-07-26 DIAGNOSIS — R07.9 CHEST PAIN, UNSPECIFIED TYPE: Primary | ICD-10-CM

## 2023-07-26 LAB
ALBUMIN SERPL-MCNC: 3.9 G/DL (ref 3.5–5)
ALBUMIN/GLOB SERPL: 0.9 (ref 1.1–2.2)
ALP SERPL-CCNC: 105 U/L (ref 45–117)
ALT SERPL-CCNC: 27 U/L (ref 12–78)
ANION GAP SERPL CALC-SCNC: 8 MMOL/L (ref 5–15)
AST SERPL-CCNC: 11 U/L (ref 15–37)
BASOPHILS # BLD: 0.1 K/UL (ref 0–0.1)
BASOPHILS NFR BLD: 1 % (ref 0–1)
BILIRUB SERPL-MCNC: 0.6 MG/DL (ref 0.2–1)
BUN SERPL-MCNC: 20 MG/DL (ref 6–20)
BUN/CREAT SERPL: 15 (ref 12–20)
CALCIUM SERPL-MCNC: 8.8 MG/DL (ref 8.5–10.1)
CHLORIDE SERPL-SCNC: 107 MMOL/L (ref 97–108)
CO2 SERPL-SCNC: 23 MMOL/L (ref 21–32)
CREAT SERPL-MCNC: 1.3 MG/DL (ref 0.55–1.02)
D DIMER PPP FEU-MCNC: 0.54 MG/L FEU (ref 0–0.65)
DIFFERENTIAL METHOD BLD: NORMAL
EKG ATRIAL RATE: 84 BPM
EKG DIAGNOSIS: NORMAL
EKG P AXIS: 66 DEGREES
EKG P-R INTERVAL: 138 MS
EKG Q-T INTERVAL: 382 MS
EKG QRS DURATION: 78 MS
EKG QTC CALCULATION (BAZETT): 451 MS
EKG R AXIS: 62 DEGREES
EKG T AXIS: 49 DEGREES
EKG VENTRICULAR RATE: 84 BPM
EOSINOPHIL # BLD: 0.2 K/UL (ref 0–0.4)
EOSINOPHIL NFR BLD: 3 % (ref 0–7)
ERYTHROCYTE [DISTWIDTH] IN BLOOD BY AUTOMATED COUNT: 12.3 % (ref 11.5–14.5)
GLOBULIN SER CALC-MCNC: 4.2 G/DL (ref 2–4)
GLUCOSE SERPL-MCNC: 159 MG/DL (ref 65–100)
HCT VFR BLD AUTO: 42.7 % (ref 35–47)
HGB BLD-MCNC: 14.5 G/DL (ref 11.5–16)
IMM GRANULOCYTES # BLD AUTO: 0 K/UL (ref 0–0.04)
IMM GRANULOCYTES NFR BLD AUTO: 0 % (ref 0–0.5)
LIPASE SERPL-CCNC: 65 U/L (ref 73–393)
LYMPHOCYTES # BLD: 1.3 K/UL (ref 0.8–3.5)
LYMPHOCYTES NFR BLD: 16 % (ref 12–49)
MCH RBC QN AUTO: 29.4 PG (ref 26–34)
MCHC RBC AUTO-ENTMCNC: 34 G/DL (ref 30–36.5)
MCV RBC AUTO: 86.4 FL (ref 80–99)
MONOCYTES # BLD: 0.4 K/UL (ref 0–1)
MONOCYTES NFR BLD: 5 % (ref 5–13)
NEUTS SEG # BLD: 5.9 K/UL (ref 1.8–8)
NEUTS SEG NFR BLD: 75 % (ref 32–75)
NRBC # BLD: 0 K/UL (ref 0–0.01)
NRBC BLD-RTO: 0 PER 100 WBC
NT PRO BNP: 39 PG/ML
PLATELET # BLD AUTO: 286 K/UL (ref 150–400)
PMV BLD AUTO: 9.6 FL (ref 8.9–12.9)
POTASSIUM SERPL-SCNC: 3.6 MMOL/L (ref 3.5–5.1)
PROT SERPL-MCNC: 8.1 G/DL (ref 6.4–8.2)
RBC # BLD AUTO: 4.94 M/UL (ref 3.8–5.2)
SODIUM SERPL-SCNC: 138 MMOL/L (ref 136–145)
TROPONIN I SERPL HS-MCNC: 11 NG/L (ref 0–51)
TROPONIN I SERPL HS-MCNC: 7 NG/L (ref 0–51)
WBC # BLD AUTO: 7.9 K/UL (ref 3.6–11)

## 2023-07-26 PROCEDURE — 36415 COLL VENOUS BLD VENIPUNCTURE: CPT

## 2023-07-26 PROCEDURE — 85379 FIBRIN DEGRADATION QUANT: CPT

## 2023-07-26 PROCEDURE — 6370000000 HC RX 637 (ALT 250 FOR IP): Performed by: EMERGENCY MEDICINE

## 2023-07-26 PROCEDURE — 6360000002 HC RX W HCPCS: Performed by: EMERGENCY MEDICINE

## 2023-07-26 PROCEDURE — 71045 X-RAY EXAM CHEST 1 VIEW: CPT

## 2023-07-26 PROCEDURE — 99285 EMERGENCY DEPT VISIT HI MDM: CPT

## 2023-07-26 PROCEDURE — 80053 COMPREHEN METABOLIC PANEL: CPT

## 2023-07-26 PROCEDURE — 93005 ELECTROCARDIOGRAM TRACING: CPT | Performed by: EMERGENCY MEDICINE

## 2023-07-26 PROCEDURE — 96374 THER/PROPH/DIAG INJ IV PUSH: CPT

## 2023-07-26 PROCEDURE — 94762 N-INVAS EAR/PLS OXIMTRY CONT: CPT

## 2023-07-26 PROCEDURE — 83880 ASSAY OF NATRIURETIC PEPTIDE: CPT

## 2023-07-26 PROCEDURE — 83690 ASSAY OF LIPASE: CPT

## 2023-07-26 PROCEDURE — 85025 COMPLETE CBC W/AUTO DIFF WBC: CPT

## 2023-07-26 PROCEDURE — 84484 ASSAY OF TROPONIN QUANT: CPT

## 2023-07-26 RX ORDER — LORAZEPAM 2 MG/ML
1 INJECTION INTRAMUSCULAR ONCE
Status: COMPLETED | OUTPATIENT
Start: 2023-07-26 | End: 2023-07-26

## 2023-07-26 RX ORDER — HYDROXYZINE HYDROCHLORIDE 25 MG/1
25 TABLET, FILM COATED ORAL ONCE
Status: COMPLETED | OUTPATIENT
Start: 2023-07-26 | End: 2023-07-26

## 2023-07-26 RX ORDER — HYDROXYZINE 50 MG/1
50 TABLET, FILM COATED ORAL EVERY 8 HOURS PRN
Qty: 30 TABLET | Refills: 0 | Status: SHIPPED | OUTPATIENT
Start: 2023-07-26 | End: 2023-08-05

## 2023-07-26 RX ORDER — ASPIRIN 81 MG/1
324 TABLET, CHEWABLE ORAL DAILY
Status: DISCONTINUED | OUTPATIENT
Start: 2023-07-26 | End: 2023-07-26 | Stop reason: HOSPADM

## 2023-07-26 RX ADMIN — HYDROXYZINE HYDROCHLORIDE 25 MG: 25 TABLET, FILM COATED ORAL at 03:53

## 2023-07-26 RX ADMIN — LORAZEPAM 1 MG: 2 INJECTION INTRAMUSCULAR; INTRAVENOUS at 01:25

## 2023-07-26 ASSESSMENT — PAIN DESCRIPTION - LOCATION
LOCATION: CHEST

## 2023-07-26 ASSESSMENT — PAIN DESCRIPTION - ONSET
ONSET: ON-GOING
ONSET: ON-GOING

## 2023-07-26 ASSESSMENT — PAIN DESCRIPTION - PAIN TYPE
TYPE: ACUTE PAIN

## 2023-07-26 ASSESSMENT — PAIN - FUNCTIONAL ASSESSMENT
PAIN_FUNCTIONAL_ASSESSMENT: 0-10
PAIN_FUNCTIONAL_ASSESSMENT: 0-10

## 2023-07-26 ASSESSMENT — PAIN DESCRIPTION - ORIENTATION
ORIENTATION: MID

## 2023-07-26 ASSESSMENT — PAIN DESCRIPTION - FREQUENCY
FREQUENCY: INTERMITTENT
FREQUENCY: CONTINUOUS
FREQUENCY: INTERMITTENT

## 2023-07-26 ASSESSMENT — PAIN DESCRIPTION - DESCRIPTORS
DESCRIPTORS: TIGHTNESS

## 2023-07-26 ASSESSMENT — PAIN SCALES - GENERAL
PAINLEVEL_OUTOF10: 7

## 2023-07-26 ASSESSMENT — LIFESTYLE VARIABLES
HOW OFTEN DO YOU HAVE A DRINK CONTAINING ALCOHOL: NEVER
HOW MANY STANDARD DRINKS CONTAINING ALCOHOL DO YOU HAVE ON A TYPICAL DAY: PATIENT DOES NOT DRINK

## 2023-07-26 NOTE — ED NOTES
DC info and education reviewed with Patient, all questions answered. Patient well-appearing at time of discharge, no acute distress reported or observed.       Missy Garcia  07/26/23 2226

## 2023-07-26 NOTE — ED PROVIDER NOTES
[facility] EMERGENCY DEPT  EMERGENCY DEPARTMENT ENCOUNTER       Pt Name: Lily Reyes  MRN: [id number]  [address] [facility] 1970  Date of evaluation: 7/26/2023  Provider: Bailey Oshea DO   PCP: Henok Cleaning MD  Note Started: 1:06 AM EDT 7/26/23     CHIEF COMPLAINT       Chief Complaint   Patient presents with    Chest Pain     Pt arrives via EMS w/ CC of chest pain and SOB that began 1 hour PTA. Pt reports tightness in chest and mild dizziness. Pt has hx of anxiety and kidney stones. Per EMS lungs sounds clear in all quads. HISTORY OF PRESENT ILLNESS: 1 or more elements      History From: Patient, History limited by: none     Lily Reyes is a 50 y.o. female presenting the emergency department complaining of chest discomfort, shortness of breath, has a history of hypertension hyperlipidemia, no history of coronary artery disease, symptoms started just before going to bed, describes a tightness across her chest.  Denies any radiation of the pain       Please See MDM for Additional Details of the HPI/PMH  Nursing Notes were all reviewed and agreed with or any disagreements were addressed in the HPI. REVIEW OF SYSTEMS        Positives and Pertinent negatives as per HPI. PAST HISTORY     Past Medical History:  Past Medical History:   Diagnosis Date    Hypercholesterolemia     Hypertension        Past Surgical History:  Past Surgical History:   Procedure Laterality Date    CHOLECYSTECTOMY      ORTHOPEDIC SURGERY      herniated disc     UROLOGICAL SURGERY      URETHRAL REPAIR       Family History:  Family History   Problem Relation Age of Onset    Dementia Father     Stroke Mother     Hypertension Mother     Hypertension Father        Social History:  Social History     Tobacco Use    Smoking status: Never    Smokeless tobacco: Never   Substance Use Topics    Alcohol use: Yes     Alcohol/week: 2.5 standard drinks    Drug use: Not Currently       Allergies:   Allergies   Allergen Reactions    Amlodipine

## 2023-09-24 ENCOUNTER — APPOINTMENT (OUTPATIENT)
Facility: HOSPITAL | Age: 53
End: 2023-09-24
Payer: COMMERCIAL

## 2023-09-24 ENCOUNTER — HOSPITAL ENCOUNTER (EMERGENCY)
Facility: HOSPITAL | Age: 53
Discharge: HOME | End: 2023-09-24
Attending: EMERGENCY MEDICINE
Payer: COMMERCIAL

## 2023-09-24 VITALS
SYSTOLIC BLOOD PRESSURE: 130 MMHG | RESPIRATION RATE: 16 BRPM | HEART RATE: 94 BPM | WEIGHT: 215 LBS | OXYGEN SATURATION: 93 % | HEIGHT: 65 IN | TEMPERATURE: 99 F | BODY MASS INDEX: 35.82 KG/M2 | DIASTOLIC BLOOD PRESSURE: 94 MMHG

## 2023-09-24 DIAGNOSIS — R68.83 CHILLS: Primary | ICD-10-CM

## 2023-09-24 DIAGNOSIS — R10.9 LEFT FLANK PAIN: ICD-10-CM

## 2023-09-24 LAB
ALBUMIN SERPL-MCNC: 3.1 G/DL (ref 3.5–5)
ALBUMIN/GLOB SERPL: 0.7 (ref 1.1–2.2)
ALP SERPL-CCNC: 85 U/L (ref 45–117)
ALT SERPL-CCNC: 19 U/L (ref 12–78)
ANION GAP SERPL CALC-SCNC: 7 MMOL/L (ref 5–15)
APPEARANCE UR: ABNORMAL
AST SERPL-CCNC: 14 U/L (ref 15–37)
BACTERIA URNS QL MICRO: NEGATIVE /HPF
BASOPHILS # BLD: 0.1 K/UL (ref 0–0.1)
BASOPHILS NFR BLD: 1 % (ref 0–1)
BILIRUB SERPL-MCNC: 0.7 MG/DL (ref 0.2–1)
BILIRUB UR QL CFM: NEGATIVE
BUN SERPL-MCNC: 14 MG/DL (ref 6–20)
BUN/CREAT SERPL: 14 (ref 12–20)
CALCIUM SERPL-MCNC: 8.5 MG/DL (ref 8.5–10.1)
CHLORIDE SERPL-SCNC: 104 MMOL/L (ref 97–108)
CO2 SERPL-SCNC: 25 MMOL/L (ref 21–32)
COLOR UR: ABNORMAL
CREAT SERPL-MCNC: 1.03 MG/DL (ref 0.55–1.02)
DIFFERENTIAL METHOD BLD: ABNORMAL
EOSINOPHIL # BLD: 0 K/UL (ref 0–0.4)
EOSINOPHIL NFR BLD: 0 % (ref 0–7)
EPITH CASTS URNS QL MICRO: ABNORMAL /LPF
ERYTHROCYTE [DISTWIDTH] IN BLOOD BY AUTOMATED COUNT: 12.6 % (ref 11.5–14.5)
GLOBULIN SER CALC-MCNC: 4.5 G/DL (ref 2–4)
GLUCOSE SERPL-MCNC: 141 MG/DL (ref 65–100)
GLUCOSE UR STRIP.AUTO-MCNC: NEGATIVE MG/DL
HCG UR QL: NEGATIVE
HCT VFR BLD AUTO: 37.7 % (ref 35–47)
HGB BLD-MCNC: 12.6 G/DL (ref 11.5–16)
HGB UR QL STRIP: NEGATIVE
IMM GRANULOCYTES # BLD AUTO: 0.1 K/UL (ref 0–0.04)
IMM GRANULOCYTES NFR BLD AUTO: 1 % (ref 0–0.5)
KETONES UR QL STRIP.AUTO: ABNORMAL MG/DL
LACTATE BLD-SCNC: 1.77 MMOL/L (ref 0.4–2)
LEUKOCYTE ESTERASE UR QL STRIP.AUTO: ABNORMAL
LYMPHOCYTES # BLD: 1.1 K/UL (ref 0.8–3.5)
LYMPHOCYTES NFR BLD: 10 % (ref 12–49)
MCH RBC QN AUTO: 29.2 PG (ref 26–34)
MCHC RBC AUTO-ENTMCNC: 33.4 G/DL (ref 30–36.5)
MCV RBC AUTO: 87.3 FL (ref 80–99)
MONOCYTES # BLD: 0.9 K/UL (ref 0–1)
MONOCYTES NFR BLD: 9 % (ref 5–13)
MUCOUS THREADS URNS QL MICRO: ABNORMAL /LPF
NEUTS SEG # BLD: 8.5 K/UL (ref 1.8–8)
NEUTS SEG NFR BLD: 79 % (ref 32–75)
NITRITE UR QL STRIP.AUTO: NEGATIVE
NRBC # BLD: 0 K/UL (ref 0–0.01)
NRBC BLD-RTO: 0 PER 100 WBC
PH UR STRIP: 6 (ref 5–8)
PLATELET # BLD AUTO: 245 K/UL (ref 150–400)
PMV BLD AUTO: 9.9 FL (ref 8.9–12.9)
POTASSIUM SERPL-SCNC: 3 MMOL/L (ref 3.5–5.1)
PROT SERPL-MCNC: 7.6 G/DL (ref 6.4–8.2)
PROT UR STRIP-MCNC: 300 MG/DL
RBC # BLD AUTO: 4.32 M/UL (ref 3.8–5.2)
RBC #/AREA URNS HPF: ABNORMAL /HPF (ref 0–5)
SODIUM SERPL-SCNC: 136 MMOL/L (ref 136–145)
SP GR UR REFRACTOMETRY: 1.03
URINE CULTURE IF INDICATED: ABNORMAL
UROBILINOGEN UR QL STRIP.AUTO: 1 EU/DL (ref 0.2–1)
WBC # BLD AUTO: 10.6 K/UL (ref 3.6–11)
WBC URNS QL MICRO: ABNORMAL /HPF (ref 0–4)

## 2023-09-24 PROCEDURE — 71045 X-RAY EXAM CHEST 1 VIEW: CPT

## 2023-09-24 PROCEDURE — 74176 CT ABD & PELVIS W/O CONTRAST: CPT

## 2023-09-24 PROCEDURE — 81025 URINE PREGNANCY TEST: CPT

## 2023-09-24 PROCEDURE — 85025 COMPLETE CBC W/AUTO DIFF WBC: CPT

## 2023-09-24 PROCEDURE — 81001 URINALYSIS AUTO W/SCOPE: CPT

## 2023-09-24 PROCEDURE — 96365 THER/PROPH/DIAG IV INF INIT: CPT

## 2023-09-24 PROCEDURE — 87040 BLOOD CULTURE FOR BACTERIA: CPT

## 2023-09-24 PROCEDURE — 96375 TX/PRO/DX INJ NEW DRUG ADDON: CPT

## 2023-09-24 PROCEDURE — 36415 COLL VENOUS BLD VENIPUNCTURE: CPT

## 2023-09-24 PROCEDURE — 6360000002 HC RX W HCPCS: Performed by: EMERGENCY MEDICINE

## 2023-09-24 PROCEDURE — 83605 ASSAY OF LACTIC ACID: CPT

## 2023-09-24 PROCEDURE — 93005 ELECTROCARDIOGRAM TRACING: CPT | Performed by: EMERGENCY MEDICINE

## 2023-09-24 PROCEDURE — 2580000003 HC RX 258: Performed by: EMERGENCY MEDICINE

## 2023-09-24 PROCEDURE — 87086 URINE CULTURE/COLONY COUNT: CPT

## 2023-09-24 PROCEDURE — 99285 EMERGENCY DEPT VISIT HI MDM: CPT

## 2023-09-24 PROCEDURE — 80053 COMPREHEN METABOLIC PANEL: CPT

## 2023-09-24 RX ORDER — HYDROMORPHONE HYDROCHLORIDE 2 MG/1
2 TABLET ORAL EVERY 6 HOURS PRN
Qty: 9 TABLET | Refills: 0 | Status: SHIPPED | OUTPATIENT
Start: 2023-09-24 | End: 2023-09-27

## 2023-09-24 RX ORDER — CEFDINIR 300 MG/1
300 CAPSULE ORAL 2 TIMES DAILY
Qty: 20 CAPSULE | Refills: 0 | Status: SHIPPED | OUTPATIENT
Start: 2023-09-24 | End: 2023-10-04

## 2023-09-24 RX ORDER — ONDANSETRON 2 MG/ML
4 INJECTION INTRAMUSCULAR; INTRAVENOUS ONCE
Status: COMPLETED | OUTPATIENT
Start: 2023-09-24 | End: 2023-09-24

## 2023-09-24 RX ORDER — KETOROLAC TROMETHAMINE 30 MG/ML
15 INJECTION, SOLUTION INTRAMUSCULAR; INTRAVENOUS
Status: COMPLETED | OUTPATIENT
Start: 2023-09-24 | End: 2023-09-24

## 2023-09-24 RX ORDER — SODIUM CHLORIDE, SODIUM LACTATE, POTASSIUM CHLORIDE, AND CALCIUM CHLORIDE .6; .31; .03; .02 G/100ML; G/100ML; G/100ML; G/100ML
1000 INJECTION, SOLUTION INTRAVENOUS
Status: COMPLETED | OUTPATIENT
Start: 2023-09-24 | End: 2023-09-24

## 2023-09-24 RX ADMIN — SODIUM CHLORIDE, POTASSIUM CHLORIDE, SODIUM LACTATE AND CALCIUM CHLORIDE 1000 ML: 600; 310; 30; 20 INJECTION, SOLUTION INTRAVENOUS at 19:41

## 2023-09-24 RX ADMIN — ONDANSETRON 4 MG: 2 INJECTION INTRAMUSCULAR; INTRAVENOUS at 19:44

## 2023-09-24 RX ADMIN — SODIUM CHLORIDE 1000 MG: 900 INJECTION INTRAVENOUS at 19:43

## 2023-09-24 RX ADMIN — KETOROLAC TROMETHAMINE 15 MG: 30 INJECTION, SOLUTION INTRAMUSCULAR; INTRAVENOUS at 19:44

## 2023-09-24 ASSESSMENT — PAIN SCALES - GENERAL: PAINLEVEL_OUTOF10: 8

## 2023-09-24 NOTE — ED PROVIDER NOTES
deficit present.    Psychiatric:         Mood and Affect: Mood normal.          DIAGNOSTIC RESULTS   LABS:     Recent Results (from the past 24 hour(s))   EKG 12 Lead    Collection Time: 09/24/23  6:19 PM   Result Value Ref Range    Ventricular Rate 119 BPM    Atrial Rate 119 BPM    P-R Interval 132 ms    QRS Duration 78 ms    Q-T Interval 320 ms    QTc Calculation (Bazett) 450 ms    P Axis 36 degrees    R Axis -3 degrees    T Axis 28 degrees    Diagnosis       Sinus tachycardia  Possible Left atrial enlargement  Nonspecific ST abnormality  When compared with ECG of 26-JUL-2023 00:50,  Questionable change in QRS axis     POC Lactic Acid    Collection Time: 09/24/23  6:43 PM   Result Value Ref Range    POC Lactic Acid 1.77 0.40 - 2.00 mmol/L   CBC with Auto Differential    Collection Time: 09/24/23  7:36 PM   Result Value Ref Range    WBC 10.6 3.6 - 11.0 K/uL    RBC 4.32 3.80 - 5.20 M/uL    Hemoglobin 12.6 11.5 - 16.0 g/dL    Hematocrit 37.7 35.0 - 47.0 %    MCV 87.3 80.0 - 99.0 FL    MCH 29.2 26.0 - 34.0 PG    MCHC 33.4 30.0 - 36.5 g/dL    RDW 12.6 11.5 - 14.5 %    Platelets 818 079 - 374 K/uL    MPV 9.9 8.9 - 12.9 FL    Nucleated RBCs 0.0 0  WBC    nRBC 0.00 0.00 - 0.01 K/uL    Neutrophils % 79 (H) 32 - 75 %    Lymphocytes % 10 (L) 12 - 49 %    Monocytes % 9 5 - 13 %    Eosinophils % 0 0 - 7 %    Basophils % 1 0 - 1 %    Immature Granulocytes 1 (H) 0.0 - 0.5 %    Neutrophils Absolute 8.5 (H) 1.8 - 8.0 K/UL    Lymphocytes Absolute 1.1 0.8 - 3.5 K/UL    Monocytes Absolute 0.9 0.0 - 1.0 K/UL    Eosinophils Absolute 0.0 0.0 - 0.4 K/UL    Basophils Absolute 0.1 0.0 - 0.1 K/UL    Absolute Immature Granulocyte 0.1 (H) 0.00 - 0.04 K/UL    Differential Type AUTOMATED     CMP    Collection Time: 09/24/23  7:36 PM   Result Value Ref Range    Sodium 136 136 - 145 mmol/L    Potassium 3.0 (L) 3.5 - 5.1 mmol/L    Chloride 104 97 - 108 mmol/L    CO2 25 21 - 32 mmol/L    Anion Gap 7 5 - 15 mmol/L    Glucose 141 (H) 65 - 100

## 2023-09-24 NOTE — ED NOTES
Walked into room to obtain COVID specimen on pt. Pt refused to have COVID testing done.       Asuncion Umaña RN  09/24/23 1947

## 2023-09-25 LAB
BACTERIA SPEC CULT: NORMAL
EKG ATRIAL RATE: 119 BPM
EKG DIAGNOSIS: NORMAL
EKG P AXIS: 36 DEGREES
EKG P-R INTERVAL: 132 MS
EKG Q-T INTERVAL: 320 MS
EKG QRS DURATION: 78 MS
EKG QTC CALCULATION (BAZETT): 450 MS
EKG R AXIS: -3 DEGREES
EKG T AXIS: 28 DEGREES
EKG VENTRICULAR RATE: 119 BPM
SERVICE CMNT-IMP: NORMAL

## 2023-09-25 NOTE — DISCHARGE INSTRUCTIONS
Please take the antibiotics to prevent infection. Please use the hydromorphone as needed for pain. Follow-up with your urologist. Return for new or worsening symptoms at any time.

## 2023-09-30 LAB
BACTERIA SPEC CULT: NORMAL
BACTERIA SPEC CULT: NORMAL
SERVICE CMNT-IMP: NORMAL
SERVICE CMNT-IMP: NORMAL